# Patient Record
Sex: MALE | Race: WHITE | NOT HISPANIC OR LATINO | URBAN - METROPOLITAN AREA
[De-identification: names, ages, dates, MRNs, and addresses within clinical notes are randomized per-mention and may not be internally consistent; named-entity substitution may affect disease eponyms.]

---

## 2023-06-06 LAB
ALANINE AMINOTRANSFERASE (SGPT) (U/L) IN SER/PLAS: 21 U/L (ref 10–52)
ALBUMIN (G/DL) IN SER/PLAS: 4.3 G/DL (ref 3.4–5)
ALKALINE PHOSPHATASE (U/L) IN SER/PLAS: 57 U/L (ref 33–120)
ANION GAP IN SER/PLAS: 13 MMOL/L (ref 10–20)
APPEARANCE, URINE: CLEAR
ASPARTATE AMINOTRANSFERASE (SGOT) (U/L) IN SER/PLAS: 19 U/L (ref 9–39)
BASOPHILS (10*3/UL) IN BLOOD BY AUTOMATED COUNT: 0.06 X10E9/L (ref 0–0.1)
BASOPHILS/100 LEUKOCYTES IN BLOOD BY AUTOMATED COUNT: 0.8 % (ref 0–2)
BILIRUBIN TOTAL (MG/DL) IN SER/PLAS: 0.8 MG/DL (ref 0–1.2)
BILIRUBIN, URINE: NEGATIVE
BLOOD, URINE: NEGATIVE
CALCIUM (MG/DL) IN SER/PLAS: 9.8 MG/DL (ref 8.6–10.6)
CARBON DIOXIDE, TOTAL (MMOL/L) IN SER/PLAS: 25 MMOL/L (ref 21–32)
CD3+CD4+ ABSOLUTE: 0.28 X10E9/L (ref 0.35–2.74)
CD3+CD8+ ABSOLUTE: 1.18 X10E9/L (ref 0.08–1.49)
CD4/CD8 RATIO: 0.24 (ref 1–3.5)
CD45%: 100 %
CHLORIDE (MMOL/L) IN SER/PLAS: 103 MMOL/L (ref 98–107)
COLOR, URINE: YELLOW
CP CD3+CD4+%: 13 % (ref 29–57)
CP CD3+CD8+%: 55 % (ref 7–31)
CREATININE (MG/DL) IN SER/PLAS: 0.93 MG/DL (ref 0.5–1.3)
EOSINOPHILS (10*3/UL) IN BLOOD BY AUTOMATED COUNT: 0.25 X10E9/L (ref 0–0.7)
EOSINOPHILS/100 LEUKOCYTES IN BLOOD BY AUTOMATED COUNT: 3.4 % (ref 0–6)
ERYTHROCYTE DISTRIBUTION WIDTH (RATIO) BY AUTOMATED COUNT: 13.4 % (ref 11.5–14.5)
ERYTHROCYTE MEAN CORPUSCULAR HEMOGLOBIN CONCENTRATION (G/DL) BY AUTOMATED: 32.3 G/DL (ref 32–36)
ERYTHROCYTE MEAN CORPUSCULAR VOLUME (FL) BY AUTOMATED COUNT: 90 FL (ref 80–100)
ERYTHROCYTES (10*6/UL) IN BLOOD BY AUTOMATED COUNT: 4.84 X10E12/L (ref 4.5–5.9)
FMETH: ABNORMAL
FSIT1: ABNORMAL
GFR MALE: >90 ML/MIN/1.73M2
GLUCOSE (MG/DL) IN SER/PLAS: 84 MG/DL (ref 74–99)
GLUCOSE, URINE: NEGATIVE MG/DL
HEMATOCRIT (%) IN BLOOD BY AUTOMATED COUNT: 43.7 % (ref 41–52)
HEMOGLOBIN (G/DL) IN BLOOD: 14.1 G/DL (ref 13.5–17.5)
IMMATURE GRANULOCYTES/100 LEUKOCYTES IN BLOOD BY AUTOMATED COUNT: 0.4 % (ref 0–0.9)
KETONES, URINE: NEGATIVE MG/DL
LEUKOCYTE ESTERASE, URINE: NEGATIVE
LEUKOCYTES (10*3/UL) IN BLOOD BY AUTOMATED COUNT: 7.4 X10E9/L (ref 4.4–11.3)
LYMPHOCYTES (10*3/UL) IN BLOOD BY AUTOMATED COUNT: 2.15 X10E9/L (ref 1.2–4.8)
LYMPHOCYTES/100 LEUKOCYTES IN BLOOD BY AUTOMATED COUNT: 29.2 % (ref 13–44)
MONOCYTES (10*3/UL) IN BLOOD BY AUTOMATED COUNT: 0.57 X10E9/L (ref 0.1–1)
MONOCYTES/100 LEUKOCYTES IN BLOOD BY AUTOMATED COUNT: 7.7 % (ref 2–10)
NEUTROPHILS (10*3/UL) IN BLOOD BY AUTOMATED COUNT: 4.31 X10E9/L (ref 1.2–7.7)
NEUTROPHILS/100 LEUKOCYTES IN BLOOD BY AUTOMATED COUNT: 58.5 % (ref 40–80)
NITRITE, URINE: NEGATIVE
NRBC (PER 100 WBCS) BY AUTOMATED COUNT: 0 /100 WBC (ref 0–0)
PH, URINE: 7 (ref 5–8)
PLATELETS (10*3/UL) IN BLOOD AUTOMATED COUNT: 251 X10E9/L (ref 150–450)
POTASSIUM (MMOL/L) IN SER/PLAS: 4.3 MMOL/L (ref 3.5–5.3)
PROTEIN TOTAL: 7 G/DL (ref 6.4–8.2)
PROTEIN, URINE: NEGATIVE MG/DL
SODIUM (MMOL/L) IN SER/PLAS: 137 MMOL/L (ref 136–145)
SPECIFIC GRAVITY, URINE: 1.02 (ref 1–1.03)
UREA NITROGEN (MG/DL) IN SER/PLAS: 19 MG/DL (ref 6–23)
UROBILINOGEN, URINE: 4 MG/DL (ref 0–1.9)

## 2023-06-07 LAB
HIV-1 RNA PCR VIRAL LOAD LOG: NORMAL LOG10 CPY/ML
HIV-1 RNA VIRAL LOAD: NOT DETECTED COPIES/ML

## 2023-07-20 ENCOUNTER — HOSPITAL ENCOUNTER (OUTPATIENT)
Dept: DATA CONVERSION | Facility: HOSPITAL | Age: 58
End: 2023-07-20
Attending: INTERNAL MEDICINE | Admitting: INTERNAL MEDICINE
Payer: COMMERCIAL

## 2023-07-20 DIAGNOSIS — K31.5 OBSTRUCTION OF DUODENUM (HHS-HCC): ICD-10-CM

## 2023-07-20 DIAGNOSIS — Z13.810 ENCOUNTER FOR SCREENING FOR UPPER GASTROINTESTINAL DISORDER: ICD-10-CM

## 2023-07-20 DIAGNOSIS — K44.9 DIAPHRAGMATIC HERNIA WITHOUT OBSTRUCTION OR GANGRENE: ICD-10-CM

## 2023-08-23 PROBLEM — M79.661 RIGHT CALF PAIN: Status: ACTIVE | Noted: 2023-08-23

## 2023-08-23 PROBLEM — R20.2 PARESTHESIA OF ARM: Status: ACTIVE | Noted: 2023-08-23

## 2023-08-23 PROBLEM — M50.30 DISC DISEASE, DEGENERATIVE, CERVICAL: Status: ACTIVE | Noted: 2023-08-23

## 2023-08-23 PROBLEM — K21.9 GERD (GASTROESOPHAGEAL REFLUX DISEASE): Status: ACTIVE | Noted: 2023-08-23

## 2023-08-23 PROBLEM — I10 HYPERTENSION: Status: ACTIVE | Noted: 2023-08-23

## 2023-08-23 PROBLEM — R22.1 MASS OF SKIN OF NECK: Status: ACTIVE | Noted: 2023-08-23

## 2023-08-23 PROBLEM — N43.3 HYDROCELE: Status: ACTIVE | Noted: 2023-08-23

## 2023-08-23 PROBLEM — M79.10 MUSCULAR ACHES: Status: ACTIVE | Noted: 2023-08-23

## 2023-08-23 PROBLEM — M54.2 NECK PAIN: Status: ACTIVE | Noted: 2023-08-23

## 2023-08-23 PROBLEM — K62.82 ANAL DYSPLASIA: Status: ACTIVE | Noted: 2023-08-23

## 2023-08-23 PROBLEM — K57.32 DIVERTICULITIS OF COLON: Status: ACTIVE | Noted: 2023-08-23

## 2023-08-23 PROBLEM — L98.9 SKIN LESION: Status: ACTIVE | Noted: 2023-08-23

## 2023-08-23 PROBLEM — M54.50 LOW BACK PAIN: Status: ACTIVE | Noted: 2023-08-23

## 2023-08-23 PROBLEM — N50.89 SWELLING OF TESTICLE PRESENT ON EXAMINATION: Status: ACTIVE | Noted: 2023-08-23

## 2023-08-23 PROBLEM — B20 AIDS (MULTI): Status: ACTIVE | Noted: 2023-08-23

## 2023-08-23 PROBLEM — A63.0 ANAL CONDYLOMA: Status: ACTIVE | Noted: 2023-08-23

## 2023-08-23 PROBLEM — L21.9 SEBORRHEIC DERMATITIS: Status: ACTIVE | Noted: 2023-08-23

## 2023-08-23 PROBLEM — R63.5 WEIGHT GAIN: Status: ACTIVE | Noted: 2023-08-23

## 2023-08-23 PROBLEM — F41.9 ANXIETY: Status: ACTIVE | Noted: 2023-08-23

## 2023-08-23 PROBLEM — N50.82 SCROTAL PAIN: Status: ACTIVE | Noted: 2023-08-23

## 2023-08-23 PROBLEM — E78.00 HYPERCHOLESTEROLEMIA: Status: ACTIVE | Noted: 2023-08-23

## 2023-08-23 PROBLEM — J02.9 PHARYNGITIS: Status: ACTIVE | Noted: 2023-08-23

## 2023-08-23 PROBLEM — D17.0 LIPOMA OF SKIN AND SUBCUTANEOUS TISSUE OF NECK: Status: ACTIVE | Noted: 2023-08-23

## 2023-08-23 PROBLEM — D17.1 ABDOMINAL LIPOMA: Status: ACTIVE | Noted: 2023-08-23

## 2023-08-23 PROBLEM — I77.810 AORTIC ROOT DILATATION (CMS-HCC): Status: ACTIVE | Noted: 2023-08-23

## 2023-08-23 RX ORDER — LANSOPRAZOLE 30 MG/1
30 CAPSULE, DELAYED RELEASE ORAL 2 TIMES DAILY
COMMUNITY
Start: 2017-04-18 | End: 2024-02-13 | Stop reason: SDUPTHER

## 2023-08-23 RX ORDER — ALBUTEROL SULFATE 90 UG/1
2 AEROSOL, METERED RESPIRATORY (INHALATION) EVERY 4 HOURS PRN
COMMUNITY
Start: 2021-10-28 | End: 2023-10-03 | Stop reason: ALTCHOICE

## 2023-08-23 RX ORDER — MELOXICAM 15 MG/1
15 TABLET ORAL DAILY
COMMUNITY
Start: 2022-09-15 | End: 2023-10-03 | Stop reason: ALTCHOICE

## 2023-08-23 RX ORDER — KETOCONAZOLE 20 MG/ML
SHAMPOO, SUSPENSION TOPICAL 3 TIMES WEEKLY
COMMUNITY
Start: 2021-12-21

## 2023-08-23 RX ORDER — SERTRALINE HYDROCHLORIDE 25 MG/1
25 TABLET, FILM COATED ORAL DAILY
COMMUNITY
Start: 2021-12-21 | End: 2023-10-18 | Stop reason: SDUPTHER

## 2023-08-23 RX ORDER — TALC
5 POWDER (GRAM) TOPICAL NIGHTLY
COMMUNITY

## 2023-08-23 RX ORDER — DARUNAVIR 800 MG/1
800 TABLET, FILM COATED ORAL DAILY
COMMUNITY
End: 2024-01-18 | Stop reason: SDUPTHER

## 2023-08-23 RX ORDER — LISINOPRIL 10 MG/1
10 TABLET ORAL DAILY
COMMUNITY
Start: 2023-07-14 | End: 2024-02-13 | Stop reason: SDUPTHER

## 2023-08-23 RX ORDER — ELVITEGRAVIR, COBICISTAT, EMTRICITABINE, AND TENOFOVIR ALAFENAMIDE 150; 150; 200; 10 MG/1; MG/1; MG/1; MG/1
1 TABLET ORAL DAILY
COMMUNITY
Start: 2023-07-14 | End: 2024-01-18 | Stop reason: SDUPTHER

## 2023-08-23 RX ORDER — ASPIRIN 325 MG
325 TABLET ORAL DAILY
COMMUNITY
Start: 2018-09-22

## 2023-08-23 RX ORDER — TRIAMCINOLONE ACETONIDE 1 MG/G
CREAM TOPICAL 2 TIMES DAILY
COMMUNITY
Start: 2021-12-21

## 2023-08-23 RX ORDER — HYDROCHLOROTHIAZIDE 12.5 MG/1
12.5 TABLET ORAL DAILY
COMMUNITY
Start: 2020-06-18 | End: 2024-01-18 | Stop reason: SDUPTHER

## 2023-08-23 RX ORDER — PRAVASTATIN SODIUM 10 MG/1
10 TABLET ORAL DAILY
COMMUNITY
End: 2023-10-18 | Stop reason: ENTERED-IN-ERROR

## 2023-08-23 RX ORDER — NAPROXEN 500 MG/1
500 TABLET ORAL
COMMUNITY
End: 2024-01-18 | Stop reason: SDUPTHER

## 2023-08-23 RX ORDER — ATORVASTATIN CALCIUM 20 MG/1
20 TABLET, FILM COATED ORAL DAILY
COMMUNITY
Start: 2021-06-15 | End: 2023-10-18 | Stop reason: SDUPTHER

## 2023-08-23 RX ORDER — SERTRALINE HYDROCHLORIDE 50 MG/1
50 TABLET, FILM COATED ORAL DAILY
COMMUNITY
Start: 2022-01-19 | End: 2023-10-18 | Stop reason: SDUPTHER

## 2023-09-29 VITALS — HEIGHT: 73 IN | BODY MASS INDEX: 39.59 KG/M2 | WEIGHT: 298.72 LBS

## 2023-09-30 NOTE — H&P
History of Present Illness:   Admission Reason: gastric outlet obstruction   HPI:    ZAIRA GARCIA is a 58 year old Male           Allergies:  ·  No Known Allergies :     Medications Prior to Admission:   Admission Medication Reconciliation has not been completed for this patient.    Objective:     Objective Information:        Pain reported at 7/20 11:28: 0 = None    Physical Exam by System:    Constitutional: Well developed, awake/alert/oriented  x3, no distress, alert and cooperative   Eyes: PERRL, EOMI, clear sclera   ENMT: mucous membranes moist, no apparent injury,  no lesions seen   Head/Neck: Neck supple, no apparent injury, thyroid  without mass or tenderness, No JVD, trachea midline, no bruits   Respiratory/Thorax: Patent airways, CTAB, normal  breath sounds with good chest expansion, thorax symmetric   Cardiovascular: Regular, rate and rhythm, no murmurs,  2+ equal pulses of the extremities, normal S 1and S 2   Gastrointestinal: Nondistended, soft, non-tender,  no rebound tenderness or guarding, no masses palpable, no organomegaly, +BS, no bruits   Genitourinary: No Discharge, vesicles or other abnormalities   Musculoskeletal: ROM intact, no joint swelling, normal  strength   Extremities: normal extremities, no cyanosis edema,  contusions or wounds, no clubbing   Neurological: alert and oriented x3, intact senses,  motor, response and reflexes, normal strength   Skin: Warm and dry, no lesions, no rashes       Electronic Signatures:  Aquiles Borja)  (Signed 20-Jul-2023 12:12)   Authored: History of Present Illness, Comorbidities,  Allergies, Medications Prior to Admission, Objective, Note Completion      Last Updated: 20-Jul-2023 12:12 by Aquiles Borja)

## 2023-10-02 NOTE — PROGRESS NOTES
"HIV Clinic Follow-up Visit:    Severino Man was last seen in [facility] 6/23  Missed antiretroviral doses in last 72 hours? {antiretroviral doses:77917::\"No\"}    Doing better, doing PT and feels much better. 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 Doing wet needling and exercises. Started MG supplement in last month and helps too  Taking his meds, no problems .  Mood ok-some headaches from sinus with weather changes.  Thinks sertraline doing well for his anxiety.    Review of Systems  Negative as per HPI    CURRENT MEDICATIONS:    Current Outpatient Medications:     aspirin 325 mg tablet, Take 1 tablet (325 mg) by mouth once daily., Disp: , Rfl:     atorvastatin (Lipitor) 20 mg tablet, Take 1 tablet (20 mg) by mouth once daily., Disp: , Rfl:     darunavir (Prezista) 800 mg tablet, Take 1 tablet (800 mg) by mouth once daily., Disp: , Rfl:     Genvoya [phone] mg tablet, Take 1 tablet by mouth once daily., Disp: , Rfl:     hydroCHLOROthiazide (HYDRODiuril) 12.5 mg tablet, Take 1 tablet (12.5 mg) by mouth once daily., Disp: , Rfl:     ketoconazole (NIZOral) 2 % shampoo, Apply topically 3 (three) times a week. Apply to scalp every other day for 5 minutes and rinse, Disp: , Rfl:     lansoprazole (Prevacid) 30 mg DR capsule, Take 1 capsule (30 mg) by mouth 2 times a day., Disp: , Rfl:     lisinopril 10 mg tablet, Take 1 tablet (10 mg) by mouth once daily., Disp: , Rfl:     magnesium gluconate (Magonate) 27.5 mg magne- sium (500 mg) tablet, Take 1 tablet (27.5 mg) by mouth 2 times a day., Disp: , Rfl:     melatonin 3 mg tablet, Take 5 mg by mouth once daily at bedtime., Disp: , Rfl:     naproxen (Naprosyn) 500 mg tablet, Take 1 tablet (500 mg) by mouth 2 times a day with meals., Disp: , Rfl:     pravastatin (Pravachol) 10 mg tablet, Take 1 tablet (10 mg) by mouth once daily., Disp: , Rfl:     sertraline (Zoloft) 25 mg tablet, Take 1 tablet (25 mg) by mouth once daily., Disp: , Rfl:     sertraline " "(Zoloft) 50 mg tablet, Take 1 tablet (50 mg) by mouth once daily., Disp: , Rfl:     triamcinolone (Kenalog) 0.1 % cream, Apply topically twice a day., Disp: , Rfl:     PHYSICAL EXAMINATION:  Visit Vitals  /76 (BP Location: Left arm, Patient Position: Sitting, BP Cuff Size: Large adult)   Pulse 62   Temp 36.8 °C (98.3 °F) (Temporal)   Resp 18   Ht 1.854 m (6' 1\")   Wt 137 kg (301 lb 6.4 oz)   SpO2 96%   BMI 39.76 kg/m²   Smoking Status Former   BSA 2.66 m²       Physical Exam   Physical Exam  Constitutional:       Appearance: He is obese.   HENT:      Head: Normocephalic and atraumatic.      Mouth/Throat:      Pharynx: Oropharynx is clear.   Cardiovascular:      Rate and Rhythm: Normal rate.   Pulmonary:      Breath sounds: Normal breath sounds.   Abdominal:      Palpations: Abdomen is soft.   Musculoskeletal:      Cervical back: Neck supple.   Neurological:      Mental Status: He is alert.   Psychiatric:         Mood and Affect: Mood normal.         PERTINENT DATA:    The 10-year ASCVD risk score (Chatsworth DK, et al., 2019) is: 6.9%    Values used to calculate the score:      Age: 58 years      Sex: Male      Is Non- : No      Diabetic: No      Tobacco smoker: No      Systolic Blood Pressure: 119 mmHg      Is BP treated: Yes      HDL Cholesterol: 37.4 mg/dL      Total Cholesterol: 133 mg/dL    ASSESSMENT / PLAN:  ***    Becca Lacy MD                    "

## 2023-10-02 NOTE — PROGRESS NOTES
Physical Therapy Treatment    Patient Name: Severino Man  MRN: [id number]  Today's Date: 10/3/2023         Assessment:  Reports relief post TPN.  Demonstrates improved TA activation.  Able to progress core stabilization.    Plan:  Continue to progress core strengthening as tolerated.  TPN PRN.    Current Problem  1. Flank pain            Subjective   Reports relief from TPN last visit.  First few days were great and then sx started to come back but not to same intensity as before.    Pain  Pain Assessment: 0-10  Pain Score: 4  Pain Location:  (R flank and back)    Objective   TTP R L3-5 UPA    Outcome Measures:  Other Measures  Oswestry Disablity Index (WILBERTO): 28%    Treatments:  THERAPEUTIC EXERCISE:  TA review  TA marches 10x ea  Bridges 10x  SL ER 10x ea  SLS with 3-way tap 10x ea    MANUAL THERAPY:  TPN: R L3-5 multifidi with 60mm ea  R L3-5 UPA     HEP   Access Code: [email or web address]  URL: [email or web address]  Date: 10/03/2023  Prepared by: Che Hathaway    Exercises  - Supine Transversus Abdominis Bracing - Hands on Stomach  - 2 x daily - 7 x weekly - 1 sets - 10 reps - 5 hold  - Supine Lower Trunk Rotation  - 2 x daily - 7 x weekly - 1 sets - 10 reps  - Thoracic Sidebending with Towel Roll  - 2 x daily - 7 x weekly - 1 sets - 10 reps - 5 hold  - Hooklying Rib Cage Breathing  - 2 x daily - 7 x weekly - 1 sets - 10 reps - 5 hold  - Supine Figure 4 Piriformis Stretch  - 2 x daily - 7 x weekly - 1 sets - 5 reps - 10 hold  - Supine Posterior Pelvic Tilt  - 2 x daily - 7 x weekly - 1 sets - 10 reps - 5 hold  - Supine March  - 2 x daily - 7 x weekly - 1 sets - 10 reps  - Supine Bridge  - 2 x daily - 7 x weekly - 1 sets - 10 reps  - Clamshell  - 2 x daily - 7 x weekly - 1 sets - 10 reps  - Single Leg Balance with Clock Reach  - 2 x daily - 7 x weekly - 1 sets - 10 reps    Goals:  1. Pain 0/10  2. Oswestry: 10%  3. Independent activation of TA with all functional mobility to allow the patient to  perform bed mobility without increased sx.  4. No reports of TTP along QL or intercostals to allow patient to lift/carry > 10# without increased sx.  5. Demonstrates independence with HEP.

## 2023-10-03 ENCOUNTER — TREATMENT (OUTPATIENT)
Dept: PHYSICAL THERAPY | Facility: CLINIC | Age: 58
End: 2023-10-03
Payer: COMMERCIAL

## 2023-10-03 ENCOUNTER — OFFICE VISIT (OUTPATIENT)
Dept: IMMUNOLOGY | Facility: CLINIC | Age: 58
End: 2023-10-03
Payer: COMMERCIAL

## 2023-10-03 VITALS
WEIGHT: 301.4 LBS | HEART RATE: 62 BPM | DIASTOLIC BLOOD PRESSURE: 76 MMHG | HEIGHT: 73 IN | TEMPERATURE: 98.3 F | SYSTOLIC BLOOD PRESSURE: 119 MMHG | BODY MASS INDEX: 39.94 KG/M2 | OXYGEN SATURATION: 96 % | RESPIRATION RATE: 18 BRPM

## 2023-10-03 DIAGNOSIS — E78.00 HYPERCHOLESTEROLEMIA: ICD-10-CM

## 2023-10-03 DIAGNOSIS — R10.9 FLANK PAIN: Primary | ICD-10-CM

## 2023-10-03 DIAGNOSIS — I10 HYPERTENSION, UNSPECIFIED TYPE: ICD-10-CM

## 2023-10-03 DIAGNOSIS — Z23 NEED FOR VACCINATION: ICD-10-CM

## 2023-10-03 DIAGNOSIS — B20 AIDS (MULTI): Primary | ICD-10-CM

## 2023-10-03 DIAGNOSIS — F41.9 ANXIETY: ICD-10-CM

## 2023-10-03 PROBLEM — N50.89 SWELLING OF TESTICLE PRESENT ON EXAMINATION: Status: RESOLVED | Noted: 2023-08-23 | Resolved: 2023-10-03

## 2023-10-03 PROBLEM — Z87.09 PERSONAL HISTORY OF OTHER DISEASES OF THE RESPIRATORY SYSTEM: Status: ACTIVE | Noted: 2023-10-03

## 2023-10-03 PROBLEM — Z87.09 PERSONAL HISTORY OF OTHER DISEASES OF THE RESPIRATORY SYSTEM: Status: RESOLVED | Noted: 2023-10-03 | Resolved: 2023-10-03

## 2023-10-03 PROBLEM — J02.9 PHARYNGITIS: Status: RESOLVED | Noted: 2023-08-23 | Resolved: 2023-10-03

## 2023-10-03 PROBLEM — K57.92 DIVERTICULITIS OF INTESTINE, PART UNSPECIFIED, WITHOUT PERFORATION OR ABSCESS WITHOUT BLEEDING: Status: ACTIVE | Noted: 2023-10-03

## 2023-10-03 PROBLEM — M79.661 RIGHT CALF PAIN: Status: RESOLVED | Noted: 2023-08-23 | Resolved: 2023-10-03

## 2023-10-03 PROBLEM — Z87.01 PERSONAL HISTORY OF PNEUMONIA (RECURRENT): Status: ACTIVE | Noted: 2023-10-03

## 2023-10-03 PROBLEM — R22.1 MASS OF SKIN OF NECK: Status: RESOLVED | Noted: 2023-08-23 | Resolved: 2023-10-03

## 2023-10-03 LAB
ALBUMIN SERPL BCP-MCNC: 4.4 G/DL (ref 3.4–5)
ALP SERPL-CCNC: 59 U/L (ref 33–120)
ALT SERPL W P-5'-P-CCNC: 27 U/L (ref 10–52)
ANION GAP SERPL CALC-SCNC: 15 MMOL/L (ref 10–20)
AST SERPL W P-5'-P-CCNC: 17 U/L (ref 9–39)
BASOPHILS # BLD AUTO: 0.08 X10*3/UL (ref 0–0.1)
BASOPHILS NFR BLD AUTO: 1 %
BILIRUB SERPL-MCNC: 0.6 MG/DL (ref 0–1.2)
BUN SERPL-MCNC: 19 MG/DL (ref 6–23)
CALCIUM SERPL-MCNC: 9.8 MG/DL (ref 8.6–10.6)
CHLORIDE SERPL-SCNC: 105 MMOL/L (ref 98–107)
CHOLEST SERPL-MCNC: 147 MG/DL (ref 0–199)
CHOLESTEROL/HDL RATIO: 3.5
CO2 SERPL-SCNC: 24 MMOL/L (ref 21–32)
CREAT SERPL-MCNC: 0.83 MG/DL (ref 0.5–1.3)
EOSINOPHIL # BLD AUTO: 0.32 X10*3/UL (ref 0–0.7)
EOSINOPHIL NFR BLD AUTO: 3.9 %
ERYTHROCYTE [DISTWIDTH] IN BLOOD BY AUTOMATED COUNT: 13.3 % (ref 11.5–14.5)
GFR SERPL CREATININE-BSD FRML MDRD: >90 ML/MIN/1.73M*2
GLUCOSE SERPL-MCNC: 70 MG/DL (ref 74–99)
HCT VFR BLD AUTO: 45.6 % (ref 41–52)
HDLC SERPL-MCNC: 42.2 MG/DL
HGB BLD-MCNC: 14.5 G/DL (ref 13.5–17.5)
IMM GRANULOCYTES # BLD AUTO: 0.05 X10*3/UL (ref 0–0.7)
IMM GRANULOCYTES NFR BLD AUTO: 0.6 % (ref 0–0.9)
LDLC SERPL CALC-MCNC: 84 MG/DL (ref 140–190)
LYMPHOCYTES # BLD AUTO: 2.47 X10*3/UL (ref 1.2–4.8)
LYMPHOCYTES NFR BLD AUTO: 29.9 %
MCH RBC QN AUTO: 29.6 PG (ref 26–34)
MCHC RBC AUTO-ENTMCNC: 31.8 G/DL (ref 32–36)
MCV RBC AUTO: 93 FL (ref 80–100)
MONOCYTES # BLD AUTO: 0.63 X10*3/UL (ref 0.1–1)
MONOCYTES NFR BLD AUTO: 7.6 %
NEUTROPHILS # BLD AUTO: 4.72 X10*3/UL (ref 1.2–7.7)
NEUTROPHILS NFR BLD AUTO: 57 %
NON HDL CHOLESTEROL: 105 MG/DL (ref 0–149)
NRBC BLD-RTO: 0 /100 WBCS (ref 0–0)
PLATELET # BLD AUTO: 266 X10*3/UL (ref 150–450)
PMV BLD AUTO: 11.3 FL (ref 7.5–11.5)
POTASSIUM SERPL-SCNC: 4.3 MMOL/L (ref 3.5–5.3)
PROT SERPL-MCNC: 6.8 G/DL (ref 6.4–8.2)
RBC # BLD AUTO: 4.9 X10*6/UL (ref 4.5–5.9)
SODIUM SERPL-SCNC: 140 MMOL/L (ref 136–145)
TRIGL SERPL-MCNC: 105 MG/DL (ref 0–149)
VLDL: 21 MG/DL (ref 0–40)
WBC # BLD AUTO: 8.3 X10*3/UL (ref 4.4–11.3)

## 2023-10-03 PROCEDURE — 4274F FLU IMMUNO ADMIND RCVD: CPT | Performed by: INTERNAL MEDICINE

## 2023-10-03 PROCEDURE — 99214 OFFICE O/P EST MOD 30 MIN: CPT | Performed by: INTERNAL MEDICINE

## 2023-10-03 PROCEDURE — 99214 OFFICE O/P EST MOD 30 MIN: CPT | Mod: 25 | Performed by: INTERNAL MEDICINE

## 2023-10-03 PROCEDURE — 80051 ELECTROLYTE PANEL: CPT | Performed by: INTERNAL MEDICINE

## 2023-10-03 PROCEDURE — 97140 MANUAL THERAPY 1/> REGIONS: CPT | Mod: GP | Performed by: PHYSICAL THERAPIST

## 2023-10-03 PROCEDURE — 85025 COMPLETE CBC W/AUTO DIFF WBC: CPT | Performed by: INTERNAL MEDICINE

## 2023-10-03 PROCEDURE — 80061 LIPID PANEL: CPT | Performed by: INTERNAL MEDICINE

## 2023-10-03 PROCEDURE — 84075 ASSAY ALKALINE PHOSPHATASE: CPT | Performed by: INTERNAL MEDICINE

## 2023-10-03 PROCEDURE — 3074F SYST BP LT 130 MM HG: CPT | Performed by: INTERNAL MEDICINE

## 2023-10-03 PROCEDURE — 90471 IMMUNIZATION ADMIN: CPT | Performed by: INTERNAL MEDICINE

## 2023-10-03 PROCEDURE — 88185 FLOWCYTOMETRY/TC ADD-ON: CPT | Mod: TC | Performed by: INTERNAL MEDICINE

## 2023-10-03 PROCEDURE — 3078F DIAST BP <80 MM HG: CPT | Performed by: INTERNAL MEDICINE

## 2023-10-03 PROCEDURE — 36415 COLL VENOUS BLD VENIPUNCTURE: CPT | Performed by: INTERNAL MEDICINE

## 2023-10-03 PROCEDURE — 97110 THERAPEUTIC EXERCISES: CPT | Mod: GP | Performed by: PHYSICAL THERAPIST

## 2023-10-03 PROCEDURE — 1036F TOBACCO NON-USER: CPT | Performed by: INTERNAL MEDICINE

## 2023-10-03 RX ORDER — MAGNESIUM GLUCONATE 27.5 (500)
27.5 TABLET ORAL 2 TIMES DAILY
COMMUNITY

## 2023-10-03 ASSESSMENT — PAIN SCALES - GENERAL
PAINLEVEL_OUTOF10: 4
PAINLEVEL: 0-NO PAIN

## 2023-10-03 ASSESSMENT — PAIN - FUNCTIONAL ASSESSMENT: PAIN_FUNCTIONAL_ASSESSMENT: 0-10

## 2023-10-03 NOTE — PROGRESS NOTES
"HIV Clinic Follow-up Visit:    Severino Man was last seen in [facility] 6/23      Doing better, doing PT and feels much better. 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 Doing wet needling and exercises. Started MG supplement in last month and helps too  Taking his meds, no problems .  Mood ok-some headaches from sinus with weather changes.  Thinks sertraline doing well for his anxiety.    Review of Systems  Negative as per HPI    PHYSICAL EXAMINATION:  Visit Vitals  /76 (BP Location: Left arm, Patient Position: Sitting, BP Cuff Size: Large adult)   Pulse 62   Temp 36.8 °C (98.3 °F) (Temporal)   Resp 18   Ht 1.854 m (6' 1\")   Wt 137 kg (301 lb 6.4 oz)   SpO2 96%   BMI 39.76 kg/m²   Smoking Status Former   BSA 2.66 m²         Physical Exam  Constitutional:       Appearance: He is obese.   HENT:      Head: Normocephalic and atraumatic.      Mouth/Throat:      Pharynx: Oropharynx is clear.   Cardiovascular:      Rate and Rhythm: Normal rate.   Pulmonary:      Breath sounds: Normal breath sounds.   Abdominal:      Palpations: Abdomen is soft.   Musculoskeletal:      Cervical back: Neck supple.   Neurological:      Mental Status: He is alert.   Psychiatric:         Mood and Affect: Mood normal.         CURRENT MEDICATIONS:    Current Outpatient Medications:     aspirin 325 mg tablet, Take 1 tablet (325 mg) by mouth once daily., Disp: , Rfl:     atorvastatin (Lipitor) 20 mg tablet, Take 1 tablet (20 mg) by mouth once daily., Disp: , Rfl:     darunavir (Prezista) 800 mg tablet, Take 1 tablet (800 mg) by mouth once daily., Disp: , Rfl:     Genvoya [phone] mg tablet, Take 1 tablet by mouth once daily., Disp: , Rfl:     hydroCHLOROthiazide (HYDRODiuril) 12.5 mg tablet, Take 1 tablet (12.5 mg) by mouth once daily., Disp: , Rfl:     ketoconazole (NIZOral) 2 % shampoo, Apply topically 3 (three) times a week. Apply to scalp every other day for 5 minutes and rinse, Disp: , Rfl:     lansoprazole " (Prevacid) 30 mg DR capsule, Take 1 capsule (30 mg) by mouth 2 times a day., Disp: , Rfl:     lisinopril 10 mg tablet, Take 1 tablet (10 mg) by mouth once daily., Disp: , Rfl:     magnesium gluconate (Magonate) 27.5 mg magne- sium (500 mg) tablet, Take 1 tablet (27.5 mg) by mouth 2 times a day., Disp: , Rfl:     melatonin 3 mg tablet, Take 5 mg by mouth once daily at bedtime., Disp: , Rfl:     naproxen (Naprosyn) 500 mg tablet, Take 1 tablet (500 mg) by mouth 2 times a day with meals., Disp: , Rfl:     pravastatin (Pravachol) 10 mg tablet, Take 1 tablet (10 mg) by mouth once daily., Disp: , Rfl:     sertraline (Zoloft) 25 mg tablet, Take 1 tablet (25 mg) by mouth once daily., Disp: , Rfl:     sertraline (Zoloft) 50 mg tablet, Take 1 tablet (50 mg) by mouth once daily., Disp: , Rfl:     triamcinolone (Kenalog) 0.1 % cream, Apply topically twice a day., Disp: , Rfl:       ASSESSMENT / PLAN:  Problem List Items Addressed This Visit       AIDS (CMS/HCC) - Primary    Current Assessment & Plan     UD with CD4 280, continue genvoya/prezista  Check labs today         Relevant Orders    CD4/8 Panel    CBC and Auto Differential    Comprehensive metabolic panel    Anxiety    Current Assessment & Plan     Doing well, thinks sertraline working         Hypertension    Current Assessment & Plan     Good today, continue lisinopril, hctz         Hypercholesterolemia    Current Assessment & Plan     Check lipids today, continue atorvastatin         Relevant Orders    Lipid panel     Other Visit Diagnoses       Need for vaccination        Relevant Orders    Flu vaccine, MDCK, quadrivalent, with preservative (Completed)         Heal;ht maint:went to dentist last week. UTD with vaccines. Not sexually active ,no sti screen    Becca Lacy MD

## 2023-10-03 NOTE — PROGRESS NOTES
"HIV Clinic Follow-up Visit:    Severino Man was last seen in [facility] on Visit date not found  Missed antiretroviral doses in last 72 hours? {antiretroviral doses:72202::\"No\"}  Sexually active? {yes/no:97355}, Partner/s aware of diagnosis? {yes/no:47648},   Condom use? {Condom use:31588}, Partner on PrEP? {YES OR NO - DEFAULT NO:92587}      Review of Systems  Review of Systems    CURRENT MEDICATIONS:    Current Outpatient Medications:     aspirin 325 mg tablet, Take 1 tablet (325 mg) by mouth once daily., Disp: , Rfl:     atorvastatin (Lipitor) 20 mg tablet, Take 1 tablet (20 mg) by mouth once daily., Disp: , Rfl:     darunavir (Prezista) 800 mg tablet, Take 1 tablet (800 mg) by mouth once daily., Disp: , Rfl:     Genvoya [phone] mg tablet, Take 1 tablet by mouth once daily., Disp: , Rfl:     hydroCHLOROthiazide (HYDRODiuril) 12.5 mg tablet, Take 1 tablet (12.5 mg) by mouth once daily., Disp: , Rfl:     ketoconazole (NIZOral) 2 % shampoo, Apply topically 3 (three) times a week. Apply to scalp every other day for 5 minutes and rinse, Disp: , Rfl:     lansoprazole (Prevacid) 30 mg DR capsule, Take 1 capsule (30 mg) by mouth 2 times a day., Disp: , Rfl:     lisinopril 10 mg tablet, Take 1 tablet (10 mg) by mouth once daily., Disp: , Rfl:     magnesium gluconate (Magonate) 27.5 mg magne- sium (500 mg) tablet, Take 1 tablet (27.5 mg) by mouth 2 times a day., Disp: , Rfl:     melatonin 3 mg tablet, Take 5 mg by mouth once daily at bedtime., Disp: , Rfl:     naproxen (Naprosyn) 500 mg tablet, Take 1 tablet (500 mg) by mouth 2 times a day with meals., Disp: , Rfl:     pravastatin (Pravachol) 10 mg tablet, Take 1 tablet (10 mg) by mouth once daily., Disp: , Rfl:     sertraline (Zoloft) 25 mg tablet, Take 1 tablet (25 mg) by mouth once daily., Disp: , Rfl:     sertraline (Zoloft) 50 mg tablet, Take 1 tablet (50 mg) by mouth once daily., Disp: , Rfl:     triamcinolone (Kenalog) 0.1 % cream, Apply topically twice a day., " "Disp: , Rfl:     PHYSICAL EXAMINATION:  Visit Vitals  /76 (BP Location: Left arm, Patient Position: Sitting, BP Cuff Size: Large adult)   Pulse 62   Temp 36.8 °C (98.3 °F) (Temporal)   Resp 18   Ht 1.854 m (6' 1\")   Wt 137 kg (301 lb 6.4 oz)   SpO2 96%   BMI 39.76 kg/m²   Smoking Status Former   BSA 2.66 m²       Physical Exam   Physical Exam    PERTINENT DATA:  CrCl cannot be calculated (Patient's most recent lab result is older than the maximum 7 days allowed.).  The 10-year ASCVD risk score (Maurice DK, et al., 2019) is: 6.9%    Values used to calculate the score:      Age: 58 years      Sex: Male      Is Non- : No      Diabetic: No      Tobacco smoker: No      Systolic Blood Pressure: 119 mmHg      Is BP treated: Yes      HDL Cholesterol: 37.4 mg/dL      Total Cholesterol: 133 mg/dL    ASSESSMENT / PLAN:  ***    Becca Lacy MD      "

## 2023-10-04 LAB
CD3+CD4+ CELLS # BLD: 0.37 X10E9/L
CD3+CD4+ CELLS NFR BLD: 15 %
CD3+CD4+ CELLS/CD3+CD8+ CLL BLD: 0.28 %
CD3+CD8+ CELLS # BLD: 1.33 X10E9/L
CD3+CD8+ CELLS NFR BLD: 54 %
LYMPHOCYTES # SPEC AUTO: 2.47 X10*3/UL

## 2023-10-09 NOTE — PROGRESS NOTES
"Physical Therapy Treatment    Patient Name: Severino Man  MRN: [id number]  Today's Date: 10/10/2023  Time Calculation  Start Time: 0707  Stop Time: 0750  Time Calculation (min): 43 min    Insurance:  Visit:  4 of 20  Auth required: No    Assessment:  Able to progress hip/core strengthening without increased sx.  Able to elicit twitch response with TPN.    Plan:  Progress strengthening of hip.  Initiate TG.  Patient to initiate walking for exercise at home.    Current Problem  1. Flank pain            Subjective   Continues to have relief with TPN.  Reports higher threshold for pain.  Not having much pain with walking but apprehensive about resuming walking distance for exercise.      Pain  Pain Assessment: 0-10  Pain Score: 3  Pain Location:  (R flank)    Objective   TTP R QL and glute med    Outcome Measures:  Other Measures  Oswestry Disablity Index (WILBERTO): 28%    Treatments:  THERAPEUTIC EXERCISE:  LTR +fig 4 20\"x2 ea  SL ER 10x ea  QL hip hike off 2\" block 10x ea  GTB side stepping 2 laps    MANUAL THERAPY:  MFR with tiger tail to R glute med  TPN: R QL with 100mm    HEP   Access Code: [email or web address]  URL: [email or web address]  Date: 10/03/2023  Prepared by: Che Hathaway    Exercises  - Supine Transversus Abdominis Bracing - Hands on Stomach  - 2 x daily - 7 x weekly - 1 sets - 10 reps - 5 hold  - Supine Lower Trunk Rotation  - 2 x daily - 7 x weekly - 1 sets - 10 reps  - Thoracic Sidebending with Towel Roll  - 2 x daily - 7 x weekly - 1 sets - 10 reps - 5 hold  - Hooklying Rib Cage Breathing  - 2 x daily - 7 x weekly - 1 sets - 10 reps - 5 hold  - Supine Figure 4 Piriformis Stretch  - 2 x daily - 7 x weekly - 1 sets - 5 reps - 10 hold  - Supine Posterior Pelvic Tilt  - 2 x daily - 7 x weekly - 1 sets - 10 reps - 5 hold  - Supine March  - 2 x daily - 7 x weekly - 1 sets - 10 reps  - Supine Bridge  - 2 x daily - 7 x weekly - 1 sets - 10 reps  - Clamshell  - 2 x daily - 7 x weekly - 1 sets - 10 " reps  - Single Leg Balance with Clock Reach  - 2 x daily - 7 x weekly - 1 sets - 10 reps    Goals:  1. Pain 0/10  2. Oswestry: 10%  3. Independent activation of TA with all functional mobility to allow the patient to perform bed mobility without increased sx.  4. No reports of TTP along QL or intercostals to allow patient to lift/carry > 10# without increased sx.  5. Demonstrates independence with HEP.

## 2023-10-10 ENCOUNTER — TREATMENT (OUTPATIENT)
Dept: PHYSICAL THERAPY | Facility: CLINIC | Age: 58
End: 2023-10-10
Payer: COMMERCIAL

## 2023-10-10 DIAGNOSIS — R10.9 FLANK PAIN: Primary | ICD-10-CM

## 2023-10-10 PROCEDURE — 97140 MANUAL THERAPY 1/> REGIONS: CPT | Mod: GP | Performed by: PHYSICAL THERAPIST

## 2023-10-10 PROCEDURE — 97110 THERAPEUTIC EXERCISES: CPT | Mod: GP | Performed by: PHYSICAL THERAPIST

## 2023-10-10 ASSESSMENT — PAIN SCALES - GENERAL: PAINLEVEL_OUTOF10: 3

## 2023-10-10 ASSESSMENT — PAIN - FUNCTIONAL ASSESSMENT: PAIN_FUNCTIONAL_ASSESSMENT: 0-10

## 2023-10-12 ENCOUNTER — APPOINTMENT (OUTPATIENT)
Dept: PHYSICAL THERAPY | Facility: CLINIC | Age: 58
End: 2023-10-12
Payer: COMMERCIAL

## 2023-10-18 DIAGNOSIS — F41.9 ANXIETY: ICD-10-CM

## 2023-10-18 DIAGNOSIS — I10 HYPERTENSION, UNSPECIFIED TYPE: ICD-10-CM

## 2023-10-18 RX ORDER — ATORVASTATIN CALCIUM 20 MG/1
20 TABLET, FILM COATED ORAL DAILY
Qty: 30 TABLET | Refills: 5 | Status: SHIPPED | OUTPATIENT
Start: 2023-10-18 | End: 2024-04-12 | Stop reason: SDUPTHER

## 2023-10-18 RX ORDER — SERTRALINE HYDROCHLORIDE 50 MG/1
50 TABLET, FILM COATED ORAL DAILY
Qty: 30 TABLET | Refills: 5 | Status: SHIPPED | OUTPATIENT
Start: 2023-10-18 | End: 2024-04-12 | Stop reason: SDUPTHER

## 2023-10-18 RX ORDER — SERTRALINE HYDROCHLORIDE 25 MG/1
25 TABLET, FILM COATED ORAL DAILY
Qty: 30 TABLET | Refills: 5 | Status: SHIPPED | OUTPATIENT
Start: 2023-10-18 | End: 2024-04-12 | Stop reason: SDUPTHER

## 2023-10-18 NOTE — PROGRESS NOTES
"Physical Therapy Treatment    Patient Name: Severino Man  MRN: [id number]  Today's Date: 10/19/2023  Time Calculation  Start Time: 0835  Stop Time: 0916  Time Calculation (min): 41 min    Insurance:  Visit:  5 of 20  Auth required: No    Assessment:  Reports relief with stretches for HEP.  Progressed LE strengthening without increased sx.    Plan:  Progress to HEP.    Current Problem  1. Flank pain              Subjective   Had increased soreness after last TPN into groin for about 2 days.  States the hip hike exercise made his side uncomfortable so he discontinued that.  New hip exercises have increased soreness in glutes but assumes that bc he's using muscles differently.  Inquiring about stretches to supplement the strengthening.  Has not increased walking yet bc he was apprehensive regarding his already increased soreness.     Pain  Pain Assessment: 0-10  Pain Score: 3  Pain Location: Back    Objective   Not formally assessed      Treatments:  THERAPEUTIC EXERCISE:  Recumbent bike seat 11 random level 1 for 5mins  Seated fig 4 20\" ea  Seated HS stretch 20\" ea  LTR +fig 4 20\"x2 ea  LTR 20x  Bridges with Blue TB 10x  TG L5 SDR 6 B press 10x2  Mini squat training for HEP  QL hip hike review- pain  Side bend stretch at door frame    MANUAL THERAPY:      HEP   Access Code: [email or web address]  URL: [email or web address]  Date: 10/19/2023  Prepared by: Che Hathaway    Exercises  - Supine Transversus Abdominis Bracing - Hands on Stomach  - 2 x daily - 7 x weekly - 1 sets - 10 reps - 5 hold  - Supine Lower Trunk Rotation  - 2 x daily - 7 x weekly - 1 sets - 10 reps  - Thoracic Sidebending with Towel Roll  - 2 x daily - 7 x weekly - 1 sets - 10 reps - 5 hold  - Hooklying Rib Cage Breathing  - 2 x daily - 7 x weekly - 1 sets - 10 reps - 5 hold  - Supine Figure 4 Piriformis Stretch  - 2 x daily - 7 x weekly - 1 sets - 5 reps - 10 hold  - Supine Posterior Pelvic Tilt  - 2 x daily - 7 x weekly - 1 sets - 10 reps " - 5 hold  - Supine March  - 2 x daily - 7 x weekly - 1 sets - 10 reps  - Supine Bridge  - 2 x daily - 7 x weekly - 1 sets - 10 reps  - Clamshell  - 2 x daily - 7 x weekly - 1 sets - 10 reps  - Single Leg Balance with Clock Reach  - 2 x daily - 7 x weekly - 1 sets - 10 reps  - Hip Hiking on Step  - 2 x daily - 7 x weekly - 1 sets - 10 reps  - Side Stepping with Resistance at Feet  - 2 x daily - 7 x weekly - 1 sets - 10 reps  - Seated Figure 4 Piriformis Stretch  - 2 x daily - 7 x weekly - 1 sets - 3 reps - 20 hold  - Seated Hamstring Stretch  - 2 x daily - 7 x weekly - 1 sets - 3 reps - 20 hold  - Mini Squat with Counter Support  - 2 x daily - 7 x weekly - 1 sets - 10 reps      Goals:  1. Pain 0/10  2. Oswestry: 10%  3. Independent activation of TA with all functional mobility to allow the patient to perform bed mobility without increased sx.  4. No reports of TTP along QL or intercostals to allow patient to lift/carry > 10# without increased sx.  5. Demonstrates independence with HEP.

## 2023-10-19 ENCOUNTER — TREATMENT (OUTPATIENT)
Dept: PHYSICAL THERAPY | Facility: CLINIC | Age: 58
End: 2023-10-19
Payer: COMMERCIAL

## 2023-10-19 DIAGNOSIS — R10.9 FLANK PAIN: Primary | ICD-10-CM

## 2023-10-19 PROCEDURE — 97110 THERAPEUTIC EXERCISES: CPT | Mod: GP | Performed by: PHYSICAL THERAPIST

## 2023-10-19 ASSESSMENT — PAIN SCALES - GENERAL: PAINLEVEL_OUTOF10: 3

## 2023-10-19 ASSESSMENT — PAIN - FUNCTIONAL ASSESSMENT: PAIN_FUNCTIONAL_ASSESSMENT: 0-10

## 2023-10-26 ENCOUNTER — TREATMENT (OUTPATIENT)
Dept: PHYSICAL THERAPY | Facility: CLINIC | Age: 58
End: 2023-10-26
Payer: COMMERCIAL

## 2023-10-26 DIAGNOSIS — R10.9 FLANK PAIN: Primary | ICD-10-CM

## 2023-10-26 PROCEDURE — 97110 THERAPEUTIC EXERCISES: CPT | Mod: GP | Performed by: PHYSICAL THERAPIST

## 2023-10-26 ASSESSMENT — PAIN - FUNCTIONAL ASSESSMENT: PAIN_FUNCTIONAL_ASSESSMENT: 0-10

## 2023-10-26 ASSESSMENT — PAIN SCALES - GENERAL: PAINLEVEL_OUTOF10: 0 - NO PAIN

## 2023-10-26 NOTE — PROGRESS NOTES
"Physical Therapy Treatment    Patient Name: Severino Man  MRN: [id number]  Today's Date: 10/26/2023  Time Calculation  Start Time: 0835  Stop Time: 0916  Time Calculation (min): 41 min    Insurance:  Visit:  6 of 20  Auth required: No    Assessment:  Demonstrates understanding of HEP.    Plan:  Demonstrates ability to continue with independent home program.  Discharge to [facility].    Current Problem  1. Flank pain            Subjective   Has increased activity without increased sx.  Feels better than he thought he was capable of.    Pain  Pain Assessment: 0-10  Pain Score: 0 - No pain  Pain Location: Back    Objective   Modified Oswestry: 0%      Treatments:  THERAPEUTIC EXERCISE:  Recumbent bike seat 11 random level 1 for 5mins  LTR +fig 4 20\"x2 ea  LTR 20x  Bridges with Blue TB 10x  Seated HS stretch 20\" ea  TG L6 SDR 6 B press 10x2      HEP   Access Code: [email or web address]  URL: [email or web address]  Date: 10/19/2023  Prepared by: Che Hathaway    Exercises  - Supine Transversus Abdominis Bracing - Hands on Stomach  - 2 x daily - 7 x weekly - 1 sets - 10 reps - 5 hold  - Supine Lower Trunk Rotation  - 2 x daily - 7 x weekly - 1 sets - 10 reps  - Thoracic Sidebending with Towel Roll  - 2 x daily - 7 x weekly - 1 sets - 10 reps - 5 hold  - Hooklying Rib Cage Breathing  - 2 x daily - 7 x weekly - 1 sets - 10 reps - 5 hold  - Supine Figure 4 Piriformis Stretch  - 2 x daily - 7 x weekly - 1 sets - 5 reps - 10 hold  - Supine Posterior Pelvic Tilt  - 2 x daily - 7 x weekly - 1 sets - 10 reps - 5 hold  - Supine March  - 2 x daily - 7 x weekly - 1 sets - 10 reps  - Supine Bridge  - 2 x daily - 7 x weekly - 1 sets - 10 reps  - Clamshell  - 2 x daily - 7 x weekly - 1 sets - 10 reps  - Single Leg Balance with Clock Reach  - 2 x daily - 7 x weekly - 1 sets - 10 reps  - Hip Hiking on Step  - 2 x daily - 7 x weekly - 1 sets - 10 reps  - Side Stepping with Resistance at Feet  - 2 x daily - 7 x weekly - 1 sets - 10 " reps  - Seated Figure 4 Piriformis Stretch  - 2 x daily - 7 x weekly - 1 sets - 3 reps - 20 hold  - Seated Hamstring Stretch  - 2 x daily - 7 x weekly - 1 sets - 3 reps - 20 hold  - Mini Squat with Counter Support  - 2 x daily - 7 x weekly - 1 sets - 10 reps      Goals:  1. Pain 0/10  2. Oswestry: 10%  3. Independent activation of TA with all functional mobility to allow the patient to perform bed mobility without increased sx.  4. No reports of TTP along QL or intercostals to allow patient to lift/carry > 10# without increased sx.  5. Demonstrates independence with HEP.    Goals addressed.  Met 5/5 goals

## 2024-01-18 DIAGNOSIS — I10 HYPERTENSION, UNSPECIFIED TYPE: ICD-10-CM

## 2024-01-18 DIAGNOSIS — B20 AIDS (MULTI): ICD-10-CM

## 2024-01-18 DIAGNOSIS — M50.30 DISC DISEASE, DEGENERATIVE, CERVICAL: ICD-10-CM

## 2024-01-18 RX ORDER — DARUNAVIR 800 MG/1
800 TABLET, FILM COATED ORAL
Qty: 30 TABLET | Refills: 5 | Status: SHIPPED | OUTPATIENT
Start: 2024-01-18

## 2024-01-18 RX ORDER — ELVITEGRAVIR, COBICISTAT, EMTRICITABINE, AND TENOFOVIR ALAFENAMIDE 150; 150; 200; 10 MG/1; MG/1; MG/1; MG/1
1 TABLET ORAL DAILY
Qty: 30 TABLET | Refills: 5 | Status: SHIPPED | OUTPATIENT
Start: 2024-01-18

## 2024-01-18 RX ORDER — NAPROXEN 500 MG/1
500 TABLET ORAL
Qty: 60 TABLET | Refills: 5 | Status: SHIPPED | OUTPATIENT
Start: 2024-01-18

## 2024-01-18 RX ORDER — HYDROCHLOROTHIAZIDE 12.5 MG/1
12.5 TABLET ORAL DAILY
Qty: 30 TABLET | Refills: 5 | Status: SHIPPED | OUTPATIENT
Start: 2024-01-18

## 2024-02-13 DIAGNOSIS — K21.9 GASTROESOPHAGEAL REFLUX DISEASE, UNSPECIFIED WHETHER ESOPHAGITIS PRESENT: ICD-10-CM

## 2024-02-13 DIAGNOSIS — I10 HYPERTENSION, UNSPECIFIED TYPE: ICD-10-CM

## 2024-02-13 RX ORDER — LANSOPRAZOLE 30 MG/1
30 CAPSULE, DELAYED RELEASE ORAL 2 TIMES DAILY
Qty: 60 CAPSULE | Refills: 5 | Status: SHIPPED | OUTPATIENT
Start: 2024-02-13

## 2024-02-13 RX ORDER — LISINOPRIL 10 MG/1
10 TABLET ORAL DAILY
Qty: 30 TABLET | Refills: 5 | Status: SHIPPED | OUTPATIENT
Start: 2024-02-13

## 2024-03-06 DIAGNOSIS — B20 AIDS (MULTI): ICD-10-CM

## 2024-03-11 ENCOUNTER — LAB (OUTPATIENT)
Dept: LAB | Facility: LAB | Age: 59
End: 2024-03-11
Payer: COMMERCIAL

## 2024-03-11 DIAGNOSIS — B20 AIDS (MULTI): ICD-10-CM

## 2024-03-11 LAB
ALBUMIN SERPL BCP-MCNC: 4.2 G/DL (ref 3.4–5)
ALP SERPL-CCNC: 47 U/L (ref 33–120)
ALT SERPL W P-5'-P-CCNC: 111 U/L (ref 10–52)
ANION GAP SERPL CALC-SCNC: 11 MMOL/L (ref 10–20)
AST SERPL W P-5'-P-CCNC: 37 U/L (ref 9–39)
BASOPHILS # BLD AUTO: 0.04 X10*3/UL (ref 0–0.1)
BASOPHILS NFR BLD AUTO: 0.4 %
BILIRUB SERPL-MCNC: 0.5 MG/DL (ref 0–1.2)
BUN SERPL-MCNC: 22 MG/DL (ref 6–23)
CALCIUM SERPL-MCNC: 9.7 MG/DL (ref 8.6–10.3)
CHLORIDE SERPL-SCNC: 102 MMOL/L (ref 98–107)
CO2 SERPL-SCNC: 26 MMOL/L (ref 21–32)
CREAT SERPL-MCNC: 0.94 MG/DL (ref 0.5–1.3)
EGFRCR SERPLBLD CKD-EPI 2021: >90 ML/MIN/1.73M*2
EOSINOPHIL # BLD AUTO: 0.17 X10*3/UL (ref 0–0.7)
EOSINOPHIL NFR BLD AUTO: 1.6 %
ERYTHROCYTE [DISTWIDTH] IN BLOOD BY AUTOMATED COUNT: 13.4 % (ref 11.5–14.5)
GLUCOSE SERPL-MCNC: 93 MG/DL (ref 74–99)
HCT VFR BLD AUTO: 46 % (ref 41–52)
HGB BLD-MCNC: 15.3 G/DL (ref 13.5–17.5)
IMM GRANULOCYTES # BLD AUTO: 0.16 X10*3/UL (ref 0–0.7)
IMM GRANULOCYTES NFR BLD AUTO: 1.5 % (ref 0–0.9)
LYMPHOCYTES # BLD AUTO: 3.47 X10*3/UL (ref 1.2–4.8)
LYMPHOCYTES NFR BLD AUTO: 32.8 %
MCH RBC QN AUTO: 29.3 PG (ref 26–34)
MCHC RBC AUTO-ENTMCNC: 33.3 G/DL (ref 32–36)
MCV RBC AUTO: 88 FL (ref 80–100)
MONOCYTES # BLD AUTO: 1.01 X10*3/UL (ref 0.1–1)
MONOCYTES NFR BLD AUTO: 9.6 %
NEUTROPHILS # BLD AUTO: 5.72 X10*3/UL (ref 1.2–7.7)
NEUTROPHILS NFR BLD AUTO: 54.1 %
NRBC BLD-RTO: 0 /100 WBCS (ref 0–0)
PLATELET # BLD AUTO: 268 X10*3/UL (ref 150–450)
POTASSIUM SERPL-SCNC: 4 MMOL/L (ref 3.5–5.3)
PROT SERPL-MCNC: 6.3 G/DL (ref 6.4–8.2)
RBC # BLD AUTO: 5.23 X10*6/UL (ref 4.5–5.9)
SODIUM SERPL-SCNC: 135 MMOL/L (ref 136–145)
TREPONEMA PALLIDUM IGG+IGM AB [PRESENCE] IN SERUM OR PLASMA BY IMMUNOASSAY: NONREACTIVE
WBC # BLD AUTO: 10.6 X10*3/UL (ref 4.4–11.3)

## 2024-03-11 PROCEDURE — 85025 COMPLETE CBC W/AUTO DIFF WBC: CPT

## 2024-03-11 PROCEDURE — 86780 TREPONEMA PALLIDUM: CPT

## 2024-03-11 PROCEDURE — 88184 FLOWCYTOMETRY/ TC 1 MARKER: CPT

## 2024-03-11 PROCEDURE — 87536 HIV-1 QUANT&REVRSE TRNSCRPJ: CPT

## 2024-03-11 PROCEDURE — 36415 COLL VENOUS BLD VENIPUNCTURE: CPT

## 2024-03-11 PROCEDURE — 80053 COMPREHEN METABOLIC PANEL: CPT

## 2024-03-11 PROCEDURE — 88185 FLOWCYTOMETRY/TC ADD-ON: CPT

## 2024-03-12 LAB
CD3+CD4+ CELLS # BLD: 0.62 X10E9/L
CD3+CD4+ CELLS # BLD: 625 /MM3
CD3+CD4+ CELLS NFR BLD: 18 %
CD3+CD4+ CELLS/CD3+CD8+ CLL BLD: 0.34 %
CD3+CD8+ CELLS # BLD: 1.84 X10E9/L
CD3+CD8+ CELLS NFR BLD: 53 %
HIV1 RNA # PLAS NAA DL=20: NOT DETECTED {COPIES}/ML
HIV1 RNA SPEC NAA+PROBE-LOG#: NORMAL {LOG_COPIES}/ML
LYMPHOCYTES # SPEC AUTO: 3.47 X10*3/UL

## 2024-04-08 ENCOUNTER — TELEPHONE (OUTPATIENT)
Dept: IMMUNOLOGY | Facility: CLINIC | Age: 59
End: 2024-04-08
Payer: COMMERCIAL

## 2024-04-09 ENCOUNTER — OFFICE VISIT (OUTPATIENT)
Dept: IMMUNOLOGY | Facility: CLINIC | Age: 59
End: 2024-04-09
Payer: COMMERCIAL

## 2024-04-09 VITALS
BODY MASS INDEX: 38.13 KG/M2 | RESPIRATION RATE: 18 BRPM | SYSTOLIC BLOOD PRESSURE: 115 MMHG | DIASTOLIC BLOOD PRESSURE: 76 MMHG | OXYGEN SATURATION: 98 % | WEIGHT: 289 LBS | HEART RATE: 86 BPM

## 2024-04-09 DIAGNOSIS — I10 HYPERTENSION, UNSPECIFIED TYPE: ICD-10-CM

## 2024-04-09 DIAGNOSIS — I77.810 DILATED AORTIC ROOT (CMS-HCC): ICD-10-CM

## 2024-04-09 DIAGNOSIS — R74.01 ELEVATED ALT MEASUREMENT: ICD-10-CM

## 2024-04-09 DIAGNOSIS — I77.810 AORTIC ROOT DILATATION (CMS-HCC): ICD-10-CM

## 2024-04-09 DIAGNOSIS — B20 AIDS (MULTI): Primary | ICD-10-CM

## 2024-04-09 LAB
ALBUMIN SERPL BCP-MCNC: 4.3 G/DL (ref 3.4–5)
ALP SERPL-CCNC: 47 U/L (ref 33–120)
ALT SERPL W P-5'-P-CCNC: 61 U/L (ref 10–52)
ANION GAP SERPL CALC-SCNC: 16 MMOL/L (ref 10–20)
AST SERPL W P-5'-P-CCNC: 29 U/L (ref 9–39)
BILIRUB SERPL-MCNC: 0.5 MG/DL (ref 0–1.2)
BUN SERPL-MCNC: 16 MG/DL (ref 6–23)
CALCIUM SERPL-MCNC: 10 MG/DL (ref 8.6–10.6)
CHLORIDE SERPL-SCNC: 104 MMOL/L (ref 98–107)
CK SERPL-CCNC: 189 U/L (ref 0–325)
CO2 SERPL-SCNC: 23 MMOL/L (ref 21–32)
CREAT SERPL-MCNC: 1.02 MG/DL (ref 0.5–1.3)
EGFRCR SERPLBLD CKD-EPI 2021: 85 ML/MIN/1.73M*2
EST. AVERAGE GLUCOSE BLD GHB EST-MCNC: 108 MG/DL
GLUCOSE SERPL-MCNC: 69 MG/DL (ref 74–99)
HBA1C MFR BLD: 5.4 %
HCV AB SER QL: NONREACTIVE
POTASSIUM SERPL-SCNC: 4.1 MMOL/L (ref 3.5–5.3)
PROT SERPL-MCNC: 6.8 G/DL (ref 6.4–8.2)
SODIUM SERPL-SCNC: 139 MMOL/L (ref 136–145)

## 2024-04-09 PROCEDURE — 3078F DIAST BP <80 MM HG: CPT | Performed by: INTERNAL MEDICINE

## 2024-04-09 PROCEDURE — 83036 HEMOGLOBIN GLYCOSYLATED A1C: CPT | Performed by: INTERNAL MEDICINE

## 2024-04-09 PROCEDURE — 3074F SYST BP LT 130 MM HG: CPT | Performed by: INTERNAL MEDICINE

## 2024-04-09 PROCEDURE — 36415 COLL VENOUS BLD VENIPUNCTURE: CPT | Performed by: INTERNAL MEDICINE

## 2024-04-09 PROCEDURE — 86803 HEPATITIS C AB TEST: CPT | Performed by: INTERNAL MEDICINE

## 2024-04-09 PROCEDURE — 80053 COMPREHEN METABOLIC PANEL: CPT | Performed by: INTERNAL MEDICINE

## 2024-04-09 PROCEDURE — 82550 ASSAY OF CK (CPK): CPT | Performed by: INTERNAL MEDICINE

## 2024-04-09 PROCEDURE — 99214 OFFICE O/P EST MOD 30 MIN: CPT | Performed by: INTERNAL MEDICINE

## 2024-04-09 PROCEDURE — 1036F TOBACCO NON-USER: CPT | Performed by: INTERNAL MEDICINE

## 2024-04-09 ASSESSMENT — PAIN SCALES - GENERAL: PAINLEVEL: 4

## 2024-04-09 NOTE — PROGRESS NOTES
"Subjective   Severino Man is a 58 y.o. male who presents to the [facility] for follow-up of HIV infection.   In general doing well  Has had recurrence of his mid back pain, going back to his PT which really helped before.  Going to plastic surgeon to get his neck lipoma removed-wanted to be sure ok and I endorsed.  Has had strange episodes where feels like\"blood out of my arms\" best described as feeling when BP cuff on and tight. Thinks whole arm but not sure. No change in color or temperature. No apparent trigger  Objective   Vitals:    04/09/24 0926   BP: 115/76   Pulse: 86   Resp: 18   SpO2: 98%        Current Outpatient Medications:     aspirin 325 mg tablet, Take 1 tablet (325 mg) by mouth once daily., Disp: , Rfl:     atorvastatin (Lipitor) 20 mg tablet, Take 1 tablet (20 mg) by mouth once daily., Disp: 30 tablet, Rfl: 5    darunavir (Prezista) 800 mg tablet, Take 1 tablet (800 mg) by mouth once daily with breakfast., Disp: 30 tablet, Rfl: 5    elviteg-cob-emtri-tenof ALAFEN (Genvoya) [phone] mg tablet, Take 1 tablet by mouth once daily., Disp: 30 tablet, Rfl: 5    hydroCHLOROthiazide (HYDRODiuril) 12.5 mg tablet, Take 1 tablet (12.5 mg) by mouth once daily., Disp: 30 tablet, Rfl: 5    ketoconazole (NIZOral) 2 % shampoo, Apply topically 3 (three) times a week. Apply to scalp every other day for 5 minutes and rinse, Disp: , Rfl:     lansoprazole (Prevacid) 30 mg DR capsule, Take 1 capsule (30 mg) by mouth 2 times a day., Disp: 60 capsule, Rfl: 5    lisinopril 10 mg tablet, Take 1 tablet (10 mg) by mouth once daily., Disp: 30 tablet, Rfl: 5    magnesium gluconate (Magonate) 27.5 mg magne- sium (500 mg) tablet, Take 1 tablet (27.5 mg) by mouth 2 times a day., Disp: , Rfl:     melatonin 3 mg tablet, Take 5 mg by mouth once daily at bedtime., Disp: , Rfl:     naproxen (Naprosyn) 500 mg tablet, Take 1 tablet (500 mg) by mouth 2 times a day with meals., Disp: 60 tablet, Rfl: 5    sertraline (Zoloft) 25 mg tablet, " Take 1 tablet (25 mg) by mouth once daily., Disp: 30 tablet, Rfl: 5    sertraline (Zoloft) 50 mg tablet, Take 1 tablet (50 mg) by mouth once daily., Disp: 30 tablet, Rfl: 5    triamcinolone (Kenalog) 0.1 % cream, Apply topically twice a day., Disp: , Rfl:    Physical Exam  Physical Exam  Constitutional:       General: not in acute distress.     Appearance: Normal appearance.   HENT:      Head: Normocephalic and atraumatic.      Pharynx: Oropharynx is clear. No oropharyngeal exudate.   Eyes:      General: No scleral icterus.  Cardiovascular:      Rate and Rhythm: Normal rate and regular rhythm.   Pulmonary:      Breath sounds: Normal breath sounds. No wheezing or rhonchi.   Abdominal:      Palpations: Abdomen is soft.      Tenderness: There is no abdominal tenderness.   Musculoskeletal:      Cervical back: Neck supple.      Right lower leg: No edema.      Left lower leg: No edema.   Skin:     Findings: No rash.   Neurological:      Mental Status: alert.   Psychiatric:         Mood and Affect: Mood normal.     Laboratory  Lab Results   Component Value Date    VZQ2UFPAY NOT DETECTED 06/06/2023    ZU2LXR0SKCP 0.625 03/11/2024      Lab Results   Component Value Date    WBC 10.6 03/11/2024    HGB 15.3 03/11/2024    HCT 46.0 03/11/2024    MCV 88 03/11/2024     03/11/2024      Lab Results   Component Value Date    GLUCOSE 69 (L) 04/09/2024    CALCIUM 10.0 04/09/2024     04/09/2024    K 4.1 04/09/2024    CO2 23 04/09/2024     04/09/2024    BUN 16 04/09/2024    CREATININE 1.02 04/09/2024      Lab Results   Component Value Date    CHOL 147 10/03/2023    CHOL 133 01/10/2023    CHOL 154 04/12/2022     Lab Results   Component Value Date    HDL 42.2 10/03/2023    HDL 37.4 (A) 01/10/2023    HDL 36.5 (A) 04/12/2022     Lab Results   Component Value Date    LDLCALC 84 (L) 10/03/2023     Lab Results   Component Value Date    TRIG 105 10/03/2023    TRIG 71 01/10/2023    TRIG 110 04/12/2022     No components found  "for: \"CHOLHDL\"      Assessment/Plan   Problem List Items Addressed This Visit       AIDS (CMS/HCC) - Primary    Current Assessment & Plan     UD with good CD4, continue current ART         Aortic root dilatation (CMS/HCC)    Current Assessment & Plan     Will check echo again-ilan with the strange arm symptoms to ensure no worsening         Hypertension    Current Assessment & Plan     Good today          Relevant Orders    Hemoglobin A1C (Completed)    Elevated ALT measurement    Current Assessment & Plan     Elevated ALT on labs last month, will repeat. Check hep C Ab , is immune to B and A         Relevant Orders    Comprehensive metabolic panel (Completed)    Hepatitis C Antibody (Completed)    CK (Completed)     Other Visit Diagnoses       Dilated aortic root (CMS/HCC)        Relevant Orders    Transthoracic Echo (TTE) Complete           Health Maintenance    Becca Lacy MD   "

## 2024-04-10 ENCOUNTER — TREATMENT (OUTPATIENT)
Dept: PHYSICAL THERAPY | Facility: CLINIC | Age: 59
End: 2024-04-10

## 2024-04-10 DIAGNOSIS — R10.9 FLANK PAIN: Primary | ICD-10-CM

## 2024-04-10 PROCEDURE — 4200000004 HC PT PHASE II 15 MIN CHG: Mod: GP | Performed by: PHYSICAL THERAPIST

## 2024-04-10 ASSESSMENT — PAIN SCALES - GENERAL: PAINLEVEL_OUTOF10: 3

## 2024-04-10 ASSESSMENT — PAIN - FUNCTIONAL ASSESSMENT: PAIN_FUNCTIONAL_ASSESSMENT: 0-10

## 2024-04-10 NOTE — PROGRESS NOTES
Physical Therapy Treatment    Patient Name: Severino Man  MRN: [id number]  Today's Date: 4/10/2024  Time Calculation  Start Time: 1720  Stop Time: 1730  Time Calculation (min): 10 min    Insurance:  Visit:  1 of self pay phase II  Auth required: No  Payor:  Self pay phase II    Assessment:   Able to elicit twitch response with TPN.  Reports relief post tx.    Plan:   Continue with Phase II TPN visits x4 total to allow patient to return to exercise without increased sx.    Current Problem  1. Flank pain            General  General  Reason for Referral: R flank pain  Referred By: Self    Subjective    Presents today for Phase II self pay program for TPN.  Patient well known to this office.  Greatly benefited from TPN in the fall for R flank pain.  Has continued with HEP and doing well but states pain has started to creep back in over the last few weeks and is limiting his ability to be active.    Precautions  Precautions  Medical Precautions: No known precautions/limitation  Pain  Pain Assessment  Pain Assessment: 0-10  Pain Score: 3  Pain Location: Back  Pain Orientation: Lower, Right    Objective   TTP R QL    TREATMENT  MANUAL THERAPY:  Patient was educated on risks and benefits associated with trigger point needling, what to expect, and post procedure protocol (i.e. increased water intake, increased stretching, use of heat, etc.).  Patient educated on s/s associated with pneumothorax and to go to ED if patient were to start experiencing these after being needled in thorax region.  Patient was also educated on modifications to HEP.  Verbal consent was received to proceed with treatment.  TPN: R QL with 100mm x1    OP EDUCATION:   Patient education on diagnosis/prognosis, pathophysiology, POC, and HEP.  Patient demonstrates understanding of HEP/POC.    Goals:  No notable TTP to allow patient to return to walking program without increased sx.

## 2024-04-12 DIAGNOSIS — I10 HYPERTENSION, UNSPECIFIED TYPE: ICD-10-CM

## 2024-04-12 DIAGNOSIS — F41.9 ANXIETY: ICD-10-CM

## 2024-04-12 RX ORDER — SERTRALINE HYDROCHLORIDE 25 MG/1
25 TABLET, FILM COATED ORAL DAILY
Qty: 30 TABLET | Refills: 5 | Status: SHIPPED | OUTPATIENT
Start: 2024-04-12 | End: 2024-10-09

## 2024-04-12 RX ORDER — SERTRALINE HYDROCHLORIDE 50 MG/1
50 TABLET, FILM COATED ORAL DAILY
Qty: 30 TABLET | Refills: 5 | Status: SHIPPED | OUTPATIENT
Start: 2024-04-12 | End: 2024-10-09

## 2024-04-12 RX ORDER — ATORVASTATIN CALCIUM 20 MG/1
20 TABLET, FILM COATED ORAL DAILY
Qty: 30 TABLET | Refills: 5 | Status: SHIPPED | OUTPATIENT
Start: 2024-04-12 | End: 2024-10-09

## 2024-04-18 ENCOUNTER — TREATMENT (OUTPATIENT)
Dept: PHYSICAL THERAPY | Facility: CLINIC | Age: 59
End: 2024-04-18
Payer: COMMERCIAL

## 2024-04-18 DIAGNOSIS — R10.9 FLANK PAIN: Primary | ICD-10-CM

## 2024-04-18 PROCEDURE — 4200000004 HC PT PHASE II 15 MIN CHG: Mod: GP | Performed by: PHYSICAL THERAPIST

## 2024-04-18 ASSESSMENT — PAIN - FUNCTIONAL ASSESSMENT: PAIN_FUNCTIONAL_ASSESSMENT: 0-10

## 2024-04-18 ASSESSMENT — PAIN SCALES - GENERAL: PAINLEVEL_OUTOF10: 7

## 2024-04-18 NOTE — PROGRESS NOTES
Physical Therapy Treatment    Patient Name: Severino Man  MRN: [id number]  Today's Date: 4/18/2024  Time Calculation  Start Time: 1305  Stop Time: 1318  Time Calculation (min): 13 min    Insurance:  Visit:  2 of self pay phase II  Auth required: No  Payor:  Self pay phase II    Assessment:   Able to elicit twitch response with TPN.  Reports relief post tx.    Plan:   Continue with Phase II TPN visits x3.    Current Problem  1. Flank pain              General  General  Reason for Referral: R flank pain  Referred By: Self    Subjective    Had substantial relief for 6 days from last session.  Pain is back to same level is was before.  Hasn't been able to get out walking as much this week.    Precautions  Precautions  Medical Precautions: No known precautions/limitation  Pain  Pain Assessment  Pain Assessment: 0-10  Pain Score: 7  Pain Location: Back  Pain Orientation: Lower, Right    Objective   TTP R QL; R multifidus from T11-L1    TREATMENT  MANUAL THERAPY:  TPN: R multifidus with 60mm at T12 and L1; R QL with 100mm x1    OP EDUCATION:   Patient education on diagnosis/prognosis, pathophysiology, POC, and HEP.  Patient demonstrates understanding of HEP/POC.    Goals:  No notable TTP to allow patient to return to walking program without increased sx.

## 2024-04-24 ENCOUNTER — HOSPITAL ENCOUNTER (OUTPATIENT)
Dept: CARDIOLOGY | Facility: CLINIC | Age: 59
Discharge: HOME | End: 2024-04-24
Payer: COMMERCIAL

## 2024-04-24 VITALS
BODY MASS INDEX: 38.3 KG/M2 | WEIGHT: 289 LBS | SYSTOLIC BLOOD PRESSURE: 142 MMHG | HEIGHT: 73 IN | DIASTOLIC BLOOD PRESSURE: 74 MMHG

## 2024-04-24 DIAGNOSIS — I77.810 DILATED AORTIC ROOT (CMS-HCC): ICD-10-CM

## 2024-04-24 PROCEDURE — 93325 DOPPLER ECHO COLOR FLOW MAPG: CPT | Performed by: INTERNAL MEDICINE

## 2024-04-24 PROCEDURE — 93321 DOPPLER ECHO F-UP/LMTD STD: CPT | Performed by: INTERNAL MEDICINE

## 2024-04-24 PROCEDURE — 93308 TTE F-UP OR LMTD: CPT | Performed by: INTERNAL MEDICINE

## 2024-04-24 PROCEDURE — 93325 DOPPLER ECHO COLOR FLOW MAPG: CPT

## 2024-04-25 ENCOUNTER — TREATMENT (OUTPATIENT)
Dept: PHYSICAL THERAPY | Facility: CLINIC | Age: 59
End: 2024-04-25

## 2024-04-25 DIAGNOSIS — R10.9 FLANK PAIN: Primary | ICD-10-CM

## 2024-04-25 LAB
AORTIC VALVE MEAN GRADIENT: 7 MMHG
AORTIC VALVE PEAK VELOCITY: 1.82 M/S
AV PEAK GRADIENT: 13.2 MMHG
AVA (PEAK VEL): 2.9 CM2
AVA (VTI): 2.98 CM2
EJECTION FRACTION APICAL 4 CHAMBER: 60.7
LEFT VENTRICLE INTERNAL DIMENSION DIASTOLE: 4.8 CM (ref 3.5–6)
LEFT VENTRICULAR OUTFLOW TRACT DIAMETER: 2.2 CM
MITRAL VALVE E/A RATIO: 0.82
MITRAL VALVE E/E' RATIO: 8.1
RIGHT VENTRICLE PEAK SYSTOLIC PRESSURE: 31.9 MMHG

## 2024-04-25 PROCEDURE — 4200000004 HC PT PHASE II 15 MIN CHG: Mod: GP | Performed by: PHYSICAL THERAPIST

## 2024-04-25 ASSESSMENT — PAIN - FUNCTIONAL ASSESSMENT: PAIN_FUNCTIONAL_ASSESSMENT: 0-10

## 2024-04-25 ASSESSMENT — PAIN SCALES - GENERAL: PAINLEVEL_OUTOF10: 5 - MODERATE PAIN

## 2024-04-25 NOTE — PROGRESS NOTES
Physical Therapy Treatment    Patient Name: Severino Man  MRN: [id number]  Today's Date: 4/25/2024  Time Calculation  Start Time: 1306  Stop Time: 1316  Time Calculation (min): 10 min    Insurance:  Visit:  3 of self pay phase II  Auth required: No  Payor:  Self pay phase II    Assessment:   Able to elicit twitch response with TPN.  Reports relief post tx.    Plan:   Continue with Phase II TPN visits x1.    Current Problem  1. Flank pain            General  General  Reason for Referral: R flank pain  Referred By: Self    Subjective    Feels like he was able to have slight improved response from last session.  Hasn't resumed walking but did get gym membership and plans to start TM walking this week.  Most pain/tightness along spine.    Precautions  Precautions  Medical Precautions: No known precautions/limitation  Pain  Pain Assessment  Pain Assessment: 0-10  Pain Score: 5 - Moderate pain  Pain Location: Back  Pain Orientation: Lower, Right    Objective   R multifidus from T9-L1    TREATMENT  MANUAL THERAPY:  TPN: R multifidus with 60mm at T9-L1    OP EDUCATION:   Patient education on diagnosis/prognosis, pathophysiology, POC, and HEP.  Patient demonstrates understanding of HEP/POC.    Goals:  No notable TTP to allow patient to return to walking program without increased sx.

## 2024-05-02 ENCOUNTER — APPOINTMENT (OUTPATIENT)
Dept: PHYSICAL THERAPY | Facility: CLINIC | Age: 59
End: 2024-05-02

## 2024-07-24 DIAGNOSIS — M50.30 DISC DISEASE, DEGENERATIVE, CERVICAL: ICD-10-CM

## 2024-07-24 DIAGNOSIS — I10 HYPERTENSION, UNSPECIFIED TYPE: ICD-10-CM

## 2024-07-24 DIAGNOSIS — B20 AIDS (MULTI): ICD-10-CM

## 2024-07-24 RX ORDER — HYDROCHLOROTHIAZIDE 12.5 MG/1
12.5 TABLET ORAL DAILY
Qty: 30 TABLET | Refills: 5 | Status: SHIPPED | OUTPATIENT
Start: 2024-07-24

## 2024-07-24 RX ORDER — DARUNAVIR 800 MG/1
800 TABLET, FILM COATED ORAL
Qty: 30 TABLET | Refills: 5 | Status: SHIPPED | OUTPATIENT
Start: 2024-07-24

## 2024-07-24 RX ORDER — NAPROXEN 500 MG/1
500 TABLET ORAL 2 TIMES DAILY
Qty: 60 TABLET | Refills: 5 | Status: SHIPPED | OUTPATIENT
Start: 2024-07-24

## 2024-07-24 RX ORDER — ELVITEGRAVIR, COBICISTAT, EMTRICITABINE, AND TENOFOVIR ALAFENAMIDE 150; 150; 200; 10 MG/1; MG/1; MG/1; MG/1
1 TABLET ORAL DAILY
Qty: 30 TABLET | Refills: 5 | Status: SHIPPED | OUTPATIENT
Start: 2024-07-24

## 2024-08-08 ENCOUNTER — APPOINTMENT (OUTPATIENT)
Dept: PHYSICAL THERAPY | Facility: CLINIC | Age: 59
End: 2024-08-08
Payer: COMMERCIAL

## 2024-08-27 DIAGNOSIS — K21.9 GASTROESOPHAGEAL REFLUX DISEASE, UNSPECIFIED WHETHER ESOPHAGITIS PRESENT: ICD-10-CM

## 2024-08-27 DIAGNOSIS — I10 HYPERTENSION, UNSPECIFIED TYPE: ICD-10-CM

## 2024-08-27 RX ORDER — LANSOPRAZOLE 30 MG/1
30 CAPSULE, DELAYED RELEASE ORAL 2 TIMES DAILY
Qty: 60 CAPSULE | Refills: 5 | Status: SHIPPED | OUTPATIENT
Start: 2024-08-27

## 2024-08-27 RX ORDER — LISINOPRIL 10 MG/1
10 TABLET ORAL DAILY
Qty: 30 TABLET | Refills: 5 | Status: SHIPPED | OUTPATIENT
Start: 2024-08-27

## 2024-08-28 ENCOUNTER — DOCUMENTATION (OUTPATIENT)
Dept: IMMUNOLOGY | Facility: CLINIC | Age: 59
End: 2024-08-28
Payer: COMMERCIAL

## 2024-08-28 NOTE — PROGRESS NOTES
Prior authorization for lansoprazole 30 mg approved through 8/28/2025. Called/updated [facility] pharmacist.

## 2024-09-03 DIAGNOSIS — B20 AIDS (MULTI): ICD-10-CM

## 2024-09-03 DIAGNOSIS — E78.00 HYPERCHOLESTEROLEMIA: ICD-10-CM

## 2024-09-05 ENCOUNTER — LAB (OUTPATIENT)
Dept: LAB | Facility: LAB | Age: 59
End: 2024-09-05
Payer: COMMERCIAL

## 2024-09-05 DIAGNOSIS — E78.00 HYPERCHOLESTEROLEMIA: ICD-10-CM

## 2024-09-05 DIAGNOSIS — B20 AIDS (MULTI): ICD-10-CM

## 2024-09-05 LAB
ALBUMIN SERPL BCP-MCNC: 4.2 G/DL (ref 3.4–5)
ALP SERPL-CCNC: 46 U/L (ref 33–136)
ALT SERPL W P-5'-P-CCNC: 19 U/L (ref 10–52)
ANION GAP SERPL CALC-SCNC: 11 MMOL/L (ref 10–20)
AST SERPL W P-5'-P-CCNC: 13 U/L (ref 9–39)
BASOPHILS # BLD AUTO: 0.05 X10*3/UL (ref 0–0.1)
BASOPHILS # BLD AUTO: 0.06 X10*3/UL (ref 0–0.1)
BASOPHILS NFR BLD AUTO: 0.7 %
BASOPHILS NFR BLD AUTO: 0.8 %
BILIRUB SERPL-MCNC: 0.5 MG/DL (ref 0–1.2)
BUN SERPL-MCNC: 23 MG/DL (ref 6–23)
CALCIUM SERPL-MCNC: 9.5 MG/DL (ref 8.6–10.3)
CHLORIDE SERPL-SCNC: 105 MMOL/L (ref 98–107)
CHOLEST SERPL-MCNC: 169 MG/DL (ref 0–199)
CHOLESTEROL/HDL RATIO: 4.4
CO2 SERPL-SCNC: 25 MMOL/L (ref 21–32)
CREAT SERPL-MCNC: 0.92 MG/DL (ref 0.5–1.3)
EGFRCR SERPLBLD CKD-EPI 2021: >90 ML/MIN/1.73M*2
EOSINOPHIL # BLD AUTO: 0.25 X10*3/UL (ref 0–0.7)
EOSINOPHIL # BLD AUTO: 0.27 X10*3/UL (ref 0–0.7)
EOSINOPHIL NFR BLD AUTO: 3.4 %
EOSINOPHIL NFR BLD AUTO: 3.7 %
ERYTHROCYTE [DISTWIDTH] IN BLOOD BY AUTOMATED COUNT: 14.6 % (ref 11.5–14.5)
ERYTHROCYTE [DISTWIDTH] IN BLOOD BY AUTOMATED COUNT: 14.7 % (ref 11.5–14.5)
GLUCOSE SERPL-MCNC: 102 MG/DL (ref 74–99)
HCT VFR BLD AUTO: 42.8 % (ref 41–52)
HCT VFR BLD AUTO: 42.8 % (ref 41–52)
HDLC SERPL-MCNC: 38.6 MG/DL
HGB BLD-MCNC: 14.1 G/DL (ref 13.5–17.5)
HGB BLD-MCNC: 14.1 G/DL (ref 13.5–17.5)
IMM GRANULOCYTES # BLD AUTO: 0.04 X10*3/UL (ref 0–0.7)
IMM GRANULOCYTES # BLD AUTO: 0.04 X10*3/UL (ref 0–0.7)
IMM GRANULOCYTES NFR BLD AUTO: 0.5 % (ref 0–0.9)
IMM GRANULOCYTES NFR BLD AUTO: 0.5 % (ref 0–0.9)
LDLC SERPL CALC-MCNC: 113 MG/DL
LYMPHOCYTES # BLD AUTO: 2.21 X10*3/UL (ref 1.2–4.8)
LYMPHOCYTES # BLD AUTO: 2.26 X10*3/UL (ref 1.2–4.8)
LYMPHOCYTES NFR BLD AUTO: 30.3 %
LYMPHOCYTES NFR BLD AUTO: 30.8 %
MCH RBC QN AUTO: 29.3 PG (ref 26–34)
MCH RBC QN AUTO: 29.4 PG (ref 26–34)
MCHC RBC AUTO-ENTMCNC: 32.9 G/DL (ref 32–36)
MCHC RBC AUTO-ENTMCNC: 32.9 G/DL (ref 32–36)
MCV RBC AUTO: 89 FL (ref 80–100)
MCV RBC AUTO: 89 FL (ref 80–100)
MONOCYTES # BLD AUTO: 0.62 X10*3/UL (ref 0.1–1)
MONOCYTES # BLD AUTO: 0.63 X10*3/UL (ref 0.1–1)
MONOCYTES NFR BLD AUTO: 8.5 %
MONOCYTES NFR BLD AUTO: 8.6 %
NEUTROPHILS # BLD AUTO: 4.09 X10*3/UL (ref 1.2–7.7)
NEUTROPHILS # BLD AUTO: 4.11 X10*3/UL (ref 1.2–7.7)
NEUTROPHILS NFR BLD AUTO: 55.7 %
NEUTROPHILS NFR BLD AUTO: 56.5 %
NON HDL CHOLESTEROL: 130 MG/DL (ref 0–149)
NRBC BLD-RTO: 0 /100 WBCS (ref 0–0)
NRBC BLD-RTO: 0 /100 WBCS (ref 0–0)
PLATELET # BLD AUTO: 242 X10*3/UL (ref 150–450)
PLATELET # BLD AUTO: 248 X10*3/UL (ref 150–450)
POTASSIUM SERPL-SCNC: 4.3 MMOL/L (ref 3.5–5.3)
PROT SERPL-MCNC: 6.6 G/DL (ref 6.4–8.2)
RBC # BLD AUTO: 4.8 X10*6/UL (ref 4.5–5.9)
RBC # BLD AUTO: 4.82 X10*6/UL (ref 4.5–5.9)
SODIUM SERPL-SCNC: 137 MMOL/L (ref 136–145)
TRIGL SERPL-MCNC: 87 MG/DL (ref 0–149)
VLDL: 17 MG/DL (ref 0–40)
WBC # BLD AUTO: 7.3 X10*3/UL (ref 4.4–11.3)
WBC # BLD AUTO: 7.3 X10*3/UL (ref 4.4–11.3)

## 2024-09-05 PROCEDURE — 80053 COMPREHEN METABOLIC PANEL: CPT

## 2024-09-05 PROCEDURE — 88185 FLOWCYTOMETRY/TC ADD-ON: CPT

## 2024-09-05 PROCEDURE — 36415 COLL VENOUS BLD VENIPUNCTURE: CPT

## 2024-09-05 PROCEDURE — 87536 HIV-1 QUANT&REVRSE TRNSCRPJ: CPT

## 2024-09-05 PROCEDURE — 88184 FLOWCYTOMETRY/ TC 1 MARKER: CPT

## 2024-09-05 PROCEDURE — 80061 LIPID PANEL: CPT

## 2024-09-05 PROCEDURE — 85025 COMPLETE CBC W/AUTO DIFF WBC: CPT

## 2024-09-06 LAB
HIV1 RNA # PLAS NAA DL=20: NOT DETECTED {COPIES}/ML
HIV1 RNA SPEC NAA+PROBE-LOG#: NORMAL {LOG_COPIES}/ML

## 2024-09-08 LAB
CD3+CD4+ CELLS # BLD: 0.31 X10E9/L
CD3+CD4+ CELLS # BLD: 309 /MM3
CD3+CD4+ CELLS NFR BLD: 14 %
CD3+CD4+ CELLS/CD3+CD8+ CLL BLD: 0.25 %
CD3+CD8+ CELLS # BLD: 1.22 X10E9/L
CD3+CD8+ CELLS NFR BLD: 55 %
LYMPHOCYTES # SPEC AUTO: 2.21 X10*3/UL

## 2024-09-16 ENCOUNTER — TELEPHONE (OUTPATIENT)
Dept: IMMUNOLOGY | Facility: CLINIC | Age: 59
End: 2024-09-16
Payer: COMMERCIAL

## 2024-09-17 ENCOUNTER — OFFICE VISIT (OUTPATIENT)
Dept: IMMUNOLOGY | Facility: CLINIC | Age: 59
End: 2024-09-17
Payer: COMMERCIAL

## 2024-09-17 VITALS
OXYGEN SATURATION: 98 % | WEIGHT: 262.2 LBS | SYSTOLIC BLOOD PRESSURE: 131 MMHG | DIASTOLIC BLOOD PRESSURE: 85 MMHG | RESPIRATION RATE: 18 BRPM | BODY MASS INDEX: 34.59 KG/M2 | HEART RATE: 65 BPM

## 2024-09-17 DIAGNOSIS — B20 AIDS (MULTI): Primary | ICD-10-CM

## 2024-09-17 PROCEDURE — 99214 OFFICE O/P EST MOD 30 MIN: CPT | Performed by: INTERNAL MEDICINE

## 2024-09-17 PROCEDURE — 1036F TOBACCO NON-USER: CPT | Performed by: INTERNAL MEDICINE

## 2024-09-17 PROCEDURE — 3075F SYST BP GE 130 - 139MM HG: CPT | Performed by: INTERNAL MEDICINE

## 2024-09-17 PROCEDURE — 3079F DIAST BP 80-89 MM HG: CPT | Performed by: INTERNAL MEDICINE

## 2024-09-17 RX ORDER — DOXYCYCLINE HYCLATE 100 MG
1 TABLET ORAL
COMMUNITY
Start: 2024-09-09

## 2024-09-17 ASSESSMENT — PAIN SCALES - GENERAL: PAINLEVEL: 0-NO PAIN

## 2024-09-17 NOTE — PROGRESS NOTES
Subjective   Severino Man is a 59 y.o. male who presents to the [facility] for follow-up of HIV infection.     Doing well  Had lipoma removed from left neck, was more infiltrative than imaging suggested he reports. Unfortunately not in EPIC  Did PT for back, feeling better  Taking meds, no issues. Had labs last month, UD with CD4 300s  Dr Galvez started him on doxy 100 daily for back cyst suppression. He noticed more when he was able to stop TMP/sulfa    Since last visit had TTE, showed no change in his aortic root dilation since 2021  Objective   Vitals:    09/17/24 0910   BP: 131/85   Pulse: 65   Resp: 18   SpO2: 98%        Current Outpatient Medications:     aspirin 325 mg tablet, Take 1 tablet (325 mg) by mouth once daily., Disp: , Rfl:     atorvastatin (Lipitor) 20 mg tablet, Take 1 tablet (20 mg) by mouth once daily., Disp: 30 tablet, Rfl: 5    darunavir (Prezista) 800 mg tablet, Take 1 tablet (800 mg) by mouth once daily with breakfast., Disp: 30 tablet, Rfl: 5    doxycycline (Vibra-Tabs) 100 mg tablet, Take 1 tablet (100 mg) by mouth early in the morning.., Disp: , Rfl:     elviteg-cob-emtri-tenof ALAFEN (Genvoya) [phone] mg tablet, Take 1 tablet by mouth once daily. With food, Disp: 30 tablet, Rfl: 5    hydroCHLOROthiazide (Microzide) 12.5 mg tablet, Take 1 tablet (12.5 mg) by mouth once daily., Disp: 30 tablet, Rfl: 5    ketoconazole (NIZOral) 2 % shampoo, Apply topically 3 (three) times a week. Apply to scalp every other day for 5 minutes and rinse, Disp: , Rfl:     lansoprazole (Prevacid) 30 mg DR capsule, Take 1 capsule (30 mg) by mouth 2 times a day., Disp: 60 capsule, Rfl: 5    lisinopril 10 mg tablet, Take 1 tablet (10 mg) by mouth once daily., Disp: 30 tablet, Rfl: 5    magnesium gluconate (Magonate) 27.5 mg magne- sium (500 mg) tablet, Take 1 tablet (27.5 mg) by mouth 2 times a day., Disp: , Rfl:     melatonin 3 mg tablet, Take 5 mg by mouth once daily at bedtime., Disp: , Rfl:     naproxen  (Naprosyn) 500 mg tablet, Take 1 tablet (500 mg) by mouth 2 times a day. With meals, Disp: 60 tablet, Rfl: 5    sertraline (Zoloft) 25 mg tablet, Take 1 tablet (25 mg) by mouth once daily., Disp: 30 tablet, Rfl: 5    sertraline (Zoloft) 50 mg tablet, Take 1 tablet (50 mg) by mouth once daily., Disp: 30 tablet, Rfl: 5    triamcinolone (Kenalog) 0.1 % cream, Apply topically twice a day., Disp: , Rfl:    Physical Exam  Physical Exam  Constitutional:       General: not in acute distress.     Appearance: Normal appearance.   HENT:      Head: Normocephalic and atraumatic.      Pharynx: Oropharynx is clear. No oropharyngeal exudate.   Eyes:      General: No scleral icterus.  Cardiovascular:      Rate and Rhythm: Normal rate and regular rhythm.   Pulmonary:      Breath sounds: Normal breath sounds. No wheezing or rhonchi.   Abdominal:      Palpations: Abdomen is soft.      Tenderness: There is no abdominal tenderness.   Musculoskeletal:      Cervical back: Neck supple.      Right lower leg: No edema.      Left lower leg: No edema.   Skin:     Findings: No rash.   Neurological:      Mental Status: alert.   Psychiatric:         Mood and Affect: Mood normal.     Laboratory  Lab Results   Component Value Date    OTQ6ETWVE NOT DETECTED 06/06/2023    [id number] 0.309 (L) 09/05/2024      Lab Results   Component Value Date    WBC 7.3 09/05/2024    WBC 7.3 09/05/2024    HGB 14.1 09/05/2024    HGB 14.1 09/05/2024    HCT 42.8 09/05/2024    HCT 42.8 09/05/2024    MCV 89 09/05/2024    MCV 89 09/05/2024     09/05/2024     09/05/2024      Lab Results   Component Value Date    GLUCOSE 102 (H) 09/05/2024    CALCIUM 9.5 09/05/2024     09/05/2024    K 4.3 09/05/2024    CO2 25 09/05/2024     09/05/2024    BUN 23 09/05/2024    CREATININE 0.92 09/05/2024      Lab Results   Component Value Date    CHOL 169 09/05/2024    CHOL 147 10/03/2023    CHOL 133 01/10/2023     Lab Results   Component Value Date    HDL 38.6  "09/05/2024    HDL 42.2 10/03/2023    HDL 37.4 (A) 01/10/2023     Lab Results   Component Value Date    LDLCALC 113 (H) 09/05/2024    LDLCALC 84 (L) 10/03/2023     Lab Results   Component Value Date    TRIG 87 09/05/2024    TRIG 105 10/03/2023    TRIG 71 01/10/2023     No components found for: \"CHOLHDL\"      Assessment/Plan   Problem List Items Addressed This Visit       AIDS (Multi) - Primary    Current Assessment & Plan     UD with Cd4 300s . On statin as well           Health Maintenance  Will get flu shot and covid booster at pharmacy-does not want to feel poorly at work so will get on a weekend  Becca Lacy MD   "

## 2024-10-29 DIAGNOSIS — I10 HYPERTENSION, UNSPECIFIED TYPE: ICD-10-CM

## 2024-10-29 DIAGNOSIS — F41.9 ANXIETY: ICD-10-CM

## 2024-10-29 RX ORDER — ATORVASTATIN CALCIUM 20 MG/1
20 TABLET, FILM COATED ORAL DAILY
Qty: 30 TABLET | Refills: 5 | Status: SHIPPED | OUTPATIENT
Start: 2024-10-29 | End: 2025-04-27

## 2024-10-29 RX ORDER — SERTRALINE HYDROCHLORIDE 25 MG/1
25 TABLET, FILM COATED ORAL DAILY
Qty: 30 TABLET | Refills: 5 | Status: SHIPPED | OUTPATIENT
Start: 2024-10-29 | End: 2025-04-27

## 2024-10-29 RX ORDER — SERTRALINE HYDROCHLORIDE 50 MG/1
50 TABLET, FILM COATED ORAL DAILY
Qty: 30 TABLET | Refills: 5 | Status: SHIPPED | OUTPATIENT
Start: 2024-10-29 | End: 2025-04-27

## 2025-01-22 DIAGNOSIS — B20 AIDS (MULTI): ICD-10-CM

## 2025-01-22 RX ORDER — DARUNAVIR 800 MG/1
800 TABLET, FILM COATED ORAL
Qty: 30 TABLET | Refills: 5 | Status: SHIPPED | OUTPATIENT
Start: 2025-01-22

## 2025-02-03 DIAGNOSIS — I10 HYPERTENSION, UNSPECIFIED TYPE: ICD-10-CM

## 2025-02-03 DIAGNOSIS — B20 AIDS (MULTI): ICD-10-CM

## 2025-02-03 DIAGNOSIS — M50.30 DISC DISEASE, DEGENERATIVE, CERVICAL: ICD-10-CM

## 2025-02-03 RX ORDER — DARUNAVIR 800 MG/1
800 TABLET, FILM COATED ORAL
Qty: 30 TABLET | Refills: 5 | Status: SHIPPED | OUTPATIENT
Start: 2025-02-03

## 2025-02-03 RX ORDER — ELVITEGRAVIR, COBICISTAT, EMTRICITABINE, AND TENOFOVIR ALAFENAMIDE 150; 150; 200; 10 MG/1; MG/1; MG/1; MG/1
1 TABLET ORAL DAILY
Qty: 30 TABLET | Refills: 5 | Status: SHIPPED | OUTPATIENT
Start: 2025-02-03

## 2025-02-03 RX ORDER — NAPROXEN 500 MG/1
500 TABLET ORAL 2 TIMES DAILY
Qty: 60 TABLET | Refills: 5 | Status: SHIPPED | OUTPATIENT
Start: 2025-02-03

## 2025-02-03 RX ORDER — HYDROCHLOROTHIAZIDE 12.5 MG/1
12.5 TABLET ORAL DAILY
Qty: 30 TABLET | Refills: 5 | Status: SHIPPED | OUTPATIENT
Start: 2025-02-03

## 2025-02-25 DIAGNOSIS — I10 HYPERTENSION, UNSPECIFIED TYPE: ICD-10-CM

## 2025-02-25 DIAGNOSIS — K21.9 GASTROESOPHAGEAL REFLUX DISEASE, UNSPECIFIED WHETHER ESOPHAGITIS PRESENT: ICD-10-CM

## 2025-02-25 RX ORDER — LISINOPRIL 10 MG/1
10 TABLET ORAL DAILY
Qty: 30 TABLET | Refills: 5 | Status: SHIPPED | OUTPATIENT
Start: 2025-02-25

## 2025-02-25 RX ORDER — LANSOPRAZOLE 30 MG/1
30 CAPSULE, DELAYED RELEASE ORAL 2 TIMES DAILY
Qty: 60 CAPSULE | Refills: 5 | Status: SHIPPED | OUTPATIENT
Start: 2025-02-25

## 2025-02-27 DIAGNOSIS — B20 AIDS (MULTI): ICD-10-CM

## 2025-03-03 DIAGNOSIS — B20 AIDS (MULTI): ICD-10-CM

## 2025-03-04 ENCOUNTER — OFFICE VISIT (OUTPATIENT)
Dept: URGENT CARE | Age: 60
End: 2025-03-04
Payer: COMMERCIAL

## 2025-03-04 VITALS
WEIGHT: 250 LBS | SYSTOLIC BLOOD PRESSURE: 149 MMHG | DIASTOLIC BLOOD PRESSURE: 82 MMHG | HEART RATE: 94 BPM | TEMPERATURE: 100 F | RESPIRATION RATE: 18 BRPM | OXYGEN SATURATION: 98 % | HEIGHT: 73 IN | BODY MASS INDEX: 33.13 KG/M2

## 2025-03-04 DIAGNOSIS — R05.9 COUGH, UNSPECIFIED TYPE: ICD-10-CM

## 2025-03-04 DIAGNOSIS — J06.9 VIRAL URI: Primary | ICD-10-CM

## 2025-03-04 LAB
POC RAPID INFLUENZA A: NEGATIVE
POC RAPID INFLUENZA B: NEGATIVE
POC RAPID STREP: NEGATIVE
POC SARS-COV-2 AG BINAX: NORMAL

## 2025-03-04 PROCEDURE — 87811 SARS-COV-2 COVID19 W/OPTIC: CPT | Performed by: FAMILY MEDICINE

## 2025-03-04 PROCEDURE — 99213 OFFICE O/P EST LOW 20 MIN: CPT | Performed by: FAMILY MEDICINE

## 2025-03-04 PROCEDURE — 87880 STREP A ASSAY W/OPTIC: CPT | Performed by: FAMILY MEDICINE

## 2025-03-04 PROCEDURE — 3077F SYST BP >= 140 MM HG: CPT | Performed by: FAMILY MEDICINE

## 2025-03-04 PROCEDURE — 1036F TOBACCO NON-USER: CPT | Performed by: FAMILY MEDICINE

## 2025-03-04 PROCEDURE — 3008F BODY MASS INDEX DOCD: CPT | Performed by: FAMILY MEDICINE

## 2025-03-04 PROCEDURE — 87804 INFLUENZA ASSAY W/OPTIC: CPT | Performed by: FAMILY MEDICINE

## 2025-03-04 PROCEDURE — 3079F DIAST BP 80-89 MM HG: CPT | Performed by: FAMILY MEDICINE

## 2025-03-04 RX ORDER — BROMPHENIRAMINE MALEATE, PSEUDOEPHEDRINE HYDROCHLORIDE, AND DEXTROMETHORPHAN HYDROBROMIDE 2; 30; 10 MG/5ML; MG/5ML; MG/5ML
10 SYRUP ORAL 4 TIMES DAILY PRN
Qty: 120 ML | Refills: 0 | Status: SHIPPED | OUTPATIENT
Start: 2025-03-04 | End: 2025-03-14

## 2025-03-04 ASSESSMENT — PAIN SCALES - GENERAL: PAINLEVEL_OUTOF10: 8

## 2025-03-04 NOTE — PROGRESS NOTES
Subjective   Patient ID: Severino Man is a 59 y.o. male. They present today with a chief complaint of URI (5 days/Cough, headache, back pain, sore throat, fatigue, body aches, diarrhea,).    History of Present Illness  HPI  Days of cough, congestion, postnasal drip runny nose. No fevers have been noted. Patient denies shortness of breath, chest pain, or wheezing. No red eyes, eye pain, or discharge. They deny nausea vomiting or diarrhea. No known exposures to strep mono influenza, COVID-19 or pneumonia. No skin rashes are noted. Over-the-counter medications are offering minimal relief from symptoms.      Past Medical History  Allergies as of 03/04/2025    (No Known Allergies)       (Not in a hospital admission)       Past Medical History:   Diagnosis Date    Acute embolism and thrombosis of unspecified deep veins of right lower extremity (Multi) 01/2016    Deep vein thrombosis (DVT) of right lower extremity    Diverticulitis of intestine, part unspecified, without perforation or abscess without bleeding 2017    Diverticulitis    Malignant neoplasm of right testis, unspecified whether descended or undescended (Multi) 1982    Primary embryonal cell carcinoma of right testis    Paresthesia of skin 09/12/2015    Paresthesia of arm    Pneumocystis carinii pneumonia (Multi) 2001    Sinusitis 12/22/2022    Sore throat 10/28/2021       Past Surgical History:   Procedure Laterality Date    ANOSCOPY      with ablation, AIN2    COLECTOMY PARTIAL / TOTAL  2011    Partial Colectomy - Sigmoid    COLONOSCOPY  2018    colon normal. 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 His smoking use included cigarettes. He has been exposed to tobacco smoke. He has never used smokeless tobacco. He reports that he does not currently use alcohol. He reports  "that he does not use drugs.    Review of Systems  Review of Systems     As in history of present illness                          Objective    Vitals:    03/04/25 0802   BP: 149/82   BP Location: Left arm   Patient Position: Sitting   BP Cuff Size: Large adult   Pulse: 94   Resp: 18   Temp: 37.8 °C (100 °F)   TempSrc: Oral   SpO2: 98%   Weight: 113 kg (250 lb)   Height: 1.854 m (6' 1\")     No LMP for male patient.    Physical Exam  Gen.-alert cooperative and in no apparent distress  Head and face- no swelling redness, tenderness or rash  Eyes-EOMI, no redness or discharge is noted  ENT- bilateral nasal congestion with clear rhinorrhea and postnasal drip in oral pharynx  Neck- normal range of motion with no lymphadenopathy or mass.   Pulmonary- no respiratory distress noted. Lungs clear to auscultation without wheezes rhonchi or rales  Skin- no rashes discoloration or skin lesions noted  Lymphatic-- no lymph node swelling or tenderness appreciated  Procedures    Point of Care Test & Imaging Results from this visit  Results for orders placed or performed in visit on 03/04/25   POCT Covid-19 Rapid Antigen   Result Value Ref Range    POC BOO-COV-2 AG  Presumptive negative test for SARS-CoV-2 (no antigen detected)     Presumptive negative test for SARS-CoV-2 (no antigen detected)   POCT Influenza A/B manually resulted   Result Value Ref Range    POC Rapid Influenza A Negative Negative    POC Rapid Influenza B Negative Negative   POCT rapid strep A manually resulted   Result Value Ref Range    POC Rapid Strep Negative Negative      No results found.    Diagnostic study results (if any) were reviewed by Geovany Posey MD.    Assessment/Plan   Allergies, medications, history, and pertinent labs/EKGs/Imaging reviewed by Geovany Posey MD.     Medical Decision Making  At time of discharge patient was clinically well-appearing and HDS for outpatient management. The patient and/or family was educated regarding diagnosis, " supportive care, OTC and Rx medications. The patient and/or family was given the opportunity to ask questions prior to discharge.  They verbalized understanding of my discussion of the plans for treatment, expected course, indications to return to  or seek further evaluation in ED, and the need for timely follow up as directed.   They were provided with a work/school excuse if requested.    Orders and Diagnoses  Diagnoses and all orders for this visit:  Viral URI  Cough, unspecified type  -     POCT Covid-19 Rapid Antigen  -     POCT Influenza A/B manually resulted  -     POCT rapid strep A manually resulted      Medical Admin Record      Patient disposition: Home    Electronically signed by Geovany Posey MD  8:40 AM

## 2025-03-07 ENCOUNTER — LAB (OUTPATIENT)
Dept: LAB | Facility: HOSPITAL | Age: 60
End: 2025-03-07
Payer: COMMERCIAL

## 2025-03-07 DIAGNOSIS — B20 HUMAN IMMUNODEFICIENCY VIRUS (HIV) DISEASE (MULTI): Primary | ICD-10-CM

## 2025-03-07 LAB
ALBUMIN SERPL BCP-MCNC: 4.4 G/DL (ref 3.4–5)
ALP SERPL-CCNC: 66 U/L (ref 33–136)
ALT SERPL W P-5'-P-CCNC: 16 U/L (ref 10–52)
ANION GAP SERPL CALC-SCNC: 13 MMOL/L (ref 10–20)
AST SERPL W P-5'-P-CCNC: 16 U/L (ref 9–39)
BASOPHILS # BLD AUTO: 0.04 X10*3/UL (ref 0–0.1)
BASOPHILS NFR BLD AUTO: 0.5 %
BILIRUB SERPL-MCNC: 0.6 MG/DL (ref 0–1.2)
BUN SERPL-MCNC: 16 MG/DL (ref 6–23)
CALCIUM SERPL-MCNC: 9.6 MG/DL (ref 8.6–10.3)
CHLORIDE SERPL-SCNC: 104 MMOL/L (ref 98–107)
CO2 SERPL-SCNC: 22 MMOL/L (ref 21–32)
CREAT SERPL-MCNC: 0.9 MG/DL (ref 0.5–1.3)
EGFRCR SERPLBLD CKD-EPI 2021: >90 ML/MIN/1.73M*2
EOSINOPHIL # BLD AUTO: 0.34 X10*3/UL (ref 0–0.7)
EOSINOPHIL NFR BLD AUTO: 4.3 %
ERYTHROCYTE [DISTWIDTH] IN BLOOD BY AUTOMATED COUNT: 13.9 % (ref 11.5–14.5)
GLUCOSE SERPL-MCNC: 106 MG/DL (ref 74–99)
HCT VFR BLD AUTO: 44.6 % (ref 41–52)
HGB BLD-MCNC: 14.7 G/DL (ref 13.5–17.5)
IMM GRANULOCYTES # BLD AUTO: 0.04 X10*3/UL (ref 0–0.7)
IMM GRANULOCYTES NFR BLD AUTO: 0.5 % (ref 0–0.9)
LYMPHOCYTES # BLD AUTO: 1.93 X10*3/UL (ref 1.2–4.8)
LYMPHOCYTES NFR BLD AUTO: 24.4 %
MCH RBC QN AUTO: 29.1 PG (ref 26–34)
MCHC RBC AUTO-ENTMCNC: 33 G/DL (ref 32–36)
MCV RBC AUTO: 88 FL (ref 80–100)
MONOCYTES # BLD AUTO: 0.97 X10*3/UL (ref 0.1–1)
MONOCYTES NFR BLD AUTO: 12.2 %
NEUTROPHILS # BLD AUTO: 4.6 X10*3/UL (ref 1.2–7.7)
NEUTROPHILS NFR BLD AUTO: 58.1 %
NRBC BLD-RTO: 0 /100 WBCS (ref 0–0)
PLATELET # BLD AUTO: 229 X10*3/UL (ref 150–450)
POTASSIUM SERPL-SCNC: 4.3 MMOL/L (ref 3.5–5.3)
PROT SERPL-MCNC: 6.7 G/DL (ref 6.4–8.2)
RBC # BLD AUTO: 5.05 X10*6/UL (ref 4.5–5.9)
SODIUM SERPL-SCNC: 135 MMOL/L (ref 136–145)
WBC # BLD AUTO: 7.9 X10*3/UL (ref 4.4–11.3)

## 2025-03-07 PROCEDURE — 88185 FLOWCYTOMETRY/TC ADD-ON: CPT

## 2025-03-07 PROCEDURE — 87536 HIV-1 QUANT&REVRSE TRNSCRPJ: CPT

## 2025-03-07 PROCEDURE — 85025 COMPLETE CBC W/AUTO DIFF WBC: CPT

## 2025-03-07 PROCEDURE — 80053 COMPREHEN METABOLIC PANEL: CPT

## 2025-03-07 PROCEDURE — 88184 FLOWCYTOMETRY/ TC 1 MARKER: CPT

## 2025-03-09 LAB
CD3+CD4+ CELLS # BLD: 0.27 X10E9/L
CD3+CD4+ CELLS # BLD: 270 /MM3
CD3+CD4+ CELLS NFR BLD: 14 %
CD3+CD4+ CELLS/CD3+CD8+ CLL BLD: 0.28 %
CD3+CD8+ CELLS # BLD: 0.96 X10E9/L
CD3+CD8+ CELLS NFR BLD: 50 %
HIV1 RNA # PLAS NAA DL=20: NOT DETECTED {COPIES}/ML
HIV1 RNA SPEC NAA+PROBE-LOG#: NORMAL {LOG_COPIES}/ML
LYMPHOCYTES # SPEC AUTO: 1.93 X10*3/UL

## 2025-03-18 ENCOUNTER — OFFICE VISIT (OUTPATIENT)
Dept: IMMUNOLOGY | Facility: CLINIC | Age: 60
End: 2025-03-18
Payer: COMMERCIAL

## 2025-03-18 VITALS
DIASTOLIC BLOOD PRESSURE: 75 MMHG | HEIGHT: 73 IN | WEIGHT: 252 LBS | RESPIRATION RATE: 16 BRPM | TEMPERATURE: 97.9 F | SYSTOLIC BLOOD PRESSURE: 121 MMHG | OXYGEN SATURATION: 96 % | HEART RATE: 72 BPM | BODY MASS INDEX: 33.4 KG/M2

## 2025-03-18 DIAGNOSIS — E78.00 HYPERCHOLESTEROLEMIA: ICD-10-CM

## 2025-03-18 DIAGNOSIS — B20 AIDS (MULTI): Primary | ICD-10-CM

## 2025-03-18 DIAGNOSIS — K62.82 ANAL DYSPLASIA: ICD-10-CM

## 2025-03-18 DIAGNOSIS — I10 HYPERTENSION, UNSPECIFIED TYPE: ICD-10-CM

## 2025-03-18 PROBLEM — D17.1 LIPOMA OF ABDOMINAL WALL: Status: RESOLVED | Noted: 2025-03-18 | Resolved: 2025-03-18

## 2025-03-18 PROCEDURE — 3074F SYST BP LT 130 MM HG: CPT | Performed by: INTERNAL MEDICINE

## 2025-03-18 PROCEDURE — 99214 OFFICE O/P EST MOD 30 MIN: CPT | Performed by: INTERNAL MEDICINE

## 2025-03-18 PROCEDURE — 3078F DIAST BP <80 MM HG: CPT | Performed by: INTERNAL MEDICINE

## 2025-03-18 PROCEDURE — 1036F TOBACCO NON-USER: CPT | Performed by: INTERNAL MEDICINE

## 2025-03-18 PROCEDURE — 3008F BODY MASS INDEX DOCD: CPT | Performed by: INTERNAL MEDICINE

## 2025-03-18 RX ORDER — TRETINOIN 0.5 MG/G
CREAM TOPICAL
COMMUNITY
Start: 2025-01-22

## 2025-03-18 ASSESSMENT — PAIN SCALES - GENERAL: PAINLEVEL_OUTOF10: 0-NO PAIN

## 2025-03-18 NOTE — PROGRESS NOTES
Subjective   Severino Man is a 59 y.o. male who presents to the [facility] for follow-up of HIV infection.   Doing well. Lost some weight- watching diet and trying to walk more  Just got over a URI-thinks caught flying back from [place].  Since lost weight notices skin pouching over his old RUQ incision-looks like laxity from prior muscle resection in that area    Objective   Vitals:    03/18/25 0904   BP: 121/75   Pulse: 72   Resp: 16   Temp: 36.6 °C (97.9 °F)   SpO2: 96%        Current Outpatient Medications:     aspirin 325 mg tablet, Take 1 tablet (325 mg) by mouth once daily., Disp: , Rfl:     atorvastatin (Lipitor) 20 mg tablet, Take 1 tablet (20 mg) by mouth once daily., Disp: 30 tablet, Rfl: 5    darunavir (Prezista) 800 mg tablet, Take 1 tablet (800 mg) by mouth once daily with breakfast., Disp: 30 tablet, Rfl: 5    doxycycline (Vibra-Tabs) 100 mg tablet, Take 1 tablet (100 mg) by mouth early in the morning.., Disp: , Rfl:     elviteg-cob-emtri-tenof ALAFEN (Genvoya) [phone] mg tablet, Take 1 tablet by mouth once daily. With food, Disp: 30 tablet, Rfl: 5    hydroCHLOROthiazide (Microzide) 12.5 mg tablet, Take 1 tablet (12.5 mg) by mouth once daily., Disp: 30 tablet, Rfl: 5    ketoconazole (NIZOral) 2 % shampoo, Apply topically 3 (three) times a week. Apply to scalp every other day for 5 minutes and rinse, Disp: , Rfl:     lansoprazole (Prevacid) 30 mg DR capsule, Take 1 capsule (30 mg) by mouth 2 times a day., Disp: 60 capsule, Rfl: 5    lisinopril 10 mg tablet, Take 1 tablet (10 mg) by mouth once daily., Disp: 30 tablet, Rfl: 5    magnesium gluconate (Magonate) 27.5 mg magne- sium (500 mg) tablet, Take 1 tablet (27.5 mg) by mouth 2 times a day., Disp: , Rfl:     melatonin 3 mg tablet, Take 5 mg by mouth once daily at bedtime., Disp: , Rfl:     naproxen (Naprosyn) 500 mg tablet, Take 1 tablet (500 mg) by mouth 2 times a day. With meals, Disp: 60 tablet, Rfl: 5    sertraline (Zoloft) 25 mg tablet, Take 1  tablet (25 mg) by mouth once daily., Disp: 30 tablet, Rfl: 5    sertraline (Zoloft) 50 mg tablet, Take 1 tablet (50 mg) by mouth once daily., Disp: 30 tablet, Rfl: 5    tretinoin (Retin-A) 0.05 % cream, apply pea size amount to face at bedtime, Disp: , Rfl:     triamcinolone (Kenalog) 0.1 % cream, Apply topically twice a day., Disp: , Rfl:    Physical Exam  Physical Exam  Constitutional:       General: not in acute distress.     Appearance: Normal appearance.   HENT:      Head: Normocephalic and atraumatic.      Pharynx: Oropharynx is clear. No oropharyngeal exudate.   Eyes:      General: No scleral icterus.  Cardiovascular:      Rate and Rhythm: Normal rate and regular rhythm.   Pulmonary:      Breath sounds: Normal breath sounds. No wheezing or rhonchi.   Abdominal:      Palpations: Abdomen is soft. Well healed RUQ scar with soft, nontender skin pouching over top of the incision     Tenderness: There is no abdominal tenderness.   Musculoskeletal:      Cervical back: Neck supple.      Right lower leg: No edema.      Left lower leg: No edema.   Skin:     Findings: No rash.   Neurological:      Mental Status: alert.   Psychiatric:         Mood and Affect: Mood normal.     Laboratory  Lab Results   Component Value Date    OMP4LTCKS NOT DETECTED 06/06/2023    [id number] 0.270 (L) 03/07/2025      Lab Results   Component Value Date    WBC 7.9 03/07/2025    HGB 14.7 03/07/2025    HCT 44.6 03/07/2025    MCV 88 03/07/2025     03/07/2025      Lab Results   Component Value Date    GLUCOSE 106 (H) 03/07/2025    CALCIUM 9.6 03/07/2025     (L) 03/07/2025    K 4.3 03/07/2025    CO2 22 03/07/2025     03/07/2025    BUN 16 03/07/2025    CREATININE 0.90 03/07/2025      Lab Results   Component Value Date    CHOL 169 09/05/2024    CHOL 147 10/03/2023    CHOL 133 01/10/2023     Lab Results   Component Value Date    HDL 38.6 09/05/2024    HDL 42.2 10/03/2023    HDL 37.4 (A) 01/10/2023     Lab Results   Component Value  "Date    LDLCALC 113 (H) 09/05/2024    LDLCALC 84 (L) 10/03/2023     Lab Results   Component Value Date    TRIG 87 09/05/2024    TRIG 105 10/03/2023    TRIG 71 01/10/2023     No components found for: \"CHOLHDL\"      Assessment/Plan   Problem List Items Addressed This Visit       AIDS (Multi) - Primary    Overview     Prior K103N, 100I,188Y, K65R. Suppressed on DRV/RTV, RTG, TDF/FTC 2009-16, then EVG/C/TAF/FTC + DRV since         Current Assessment & Plan     Got labs 2 weeks ago CD4 270 and UD. Continue Genvoya/DRV         Anal dysplasia    Current Assessment & Plan     [facility]         Relevant Orders    Referral to Colorectal Surgery    Hypertension    Current Assessment & Plan     Good on lisinopril/hctz         Hypercholesterolemia    Current Assessment & Plan     On atorvastatin           Health Maintenance  Got flu shot in the fall  Up to date with dental, goes every 6 months  Not sexually active, no sti screen  Becca Lacy MD   "

## 2025-04-10 DIAGNOSIS — I10 HYPERTENSION, UNSPECIFIED TYPE: ICD-10-CM

## 2025-04-10 DIAGNOSIS — F41.9 ANXIETY: ICD-10-CM

## 2025-04-10 RX ORDER — SERTRALINE HYDROCHLORIDE 25 MG/1
25 TABLET, FILM COATED ORAL DAILY
Qty: 30 TABLET | Refills: 5 | Status: SHIPPED | OUTPATIENT
Start: 2025-04-10

## 2025-04-10 RX ORDER — ATORVASTATIN CALCIUM 20 MG/1
20 TABLET, FILM COATED ORAL DAILY
Qty: 30 TABLET | Refills: 5 | Status: SHIPPED | OUTPATIENT
Start: 2025-04-10

## 2025-04-10 RX ORDER — SERTRALINE HYDROCHLORIDE 50 MG/1
50 TABLET, FILM COATED ORAL DAILY
Qty: 30 TABLET | Refills: 5 | Status: SHIPPED | OUTPATIENT
Start: 2025-04-10

## 2025-05-12 ENCOUNTER — APPOINTMENT (OUTPATIENT)
Dept: SURGERY | Facility: CLINIC | Age: 60
End: 2025-05-12
Payer: COMMERCIAL

## 2025-05-12 DIAGNOSIS — B20 AIDS (MULTI): ICD-10-CM

## 2025-05-12 RX ORDER — DARUNAVIR 800 MG/1
TABLET, FILM COATED ORAL
Qty: 30 TABLET | Refills: 5 | Status: SHIPPED | OUTPATIENT
Start: 2025-05-12

## 2025-05-15 ENCOUNTER — APPOINTMENT (OUTPATIENT)
Dept: SURGERY | Facility: CLINIC | Age: 60
End: 2025-05-15
Payer: COMMERCIAL

## 2025-05-29 ENCOUNTER — APPOINTMENT (OUTPATIENT)
Dept: SURGERY | Facility: CLINIC | Age: 60
End: 2025-05-29
Payer: COMMERCIAL

## 2025-06-03 ENCOUNTER — APPOINTMENT (OUTPATIENT)
Facility: CLINIC | Age: 60
End: 2025-06-03
Payer: COMMERCIAL

## 2025-06-19 ENCOUNTER — OFFICE VISIT (OUTPATIENT)
Dept: URGENT CARE | Age: 60
End: 2025-06-19
Payer: COMMERCIAL

## 2025-06-19 ENCOUNTER — APPOINTMENT (OUTPATIENT)
Dept: CARDIOLOGY | Facility: HOSPITAL | Age: 60
End: 2025-06-19
Payer: COMMERCIAL

## 2025-06-19 ENCOUNTER — HOSPITAL ENCOUNTER (EMERGENCY)
Facility: HOSPITAL | Age: 60
Discharge: HOME | End: 2025-06-19
Payer: COMMERCIAL

## 2025-06-19 VITALS
WEIGHT: 260 LBS | TEMPERATURE: 97.5 F | HEART RATE: 57 BPM | SYSTOLIC BLOOD PRESSURE: 119 MMHG | RESPIRATION RATE: 18 BRPM | DIASTOLIC BLOOD PRESSURE: 66 MMHG | OXYGEN SATURATION: 98 % | HEIGHT: 73 IN | BODY MASS INDEX: 34.46 KG/M2

## 2025-06-19 VITALS
WEIGHT: 260 LBS | SYSTOLIC BLOOD PRESSURE: 124 MMHG | OXYGEN SATURATION: 96 % | DIASTOLIC BLOOD PRESSURE: 67 MMHG | BODY MASS INDEX: 34.46 KG/M2 | RESPIRATION RATE: 16 BRPM | HEART RATE: 71 BPM | TEMPERATURE: 97.9 F | HEIGHT: 73 IN

## 2025-06-19 DIAGNOSIS — Z86.718 HISTORY OF DVT IN ADULTHOOD: ICD-10-CM

## 2025-06-19 DIAGNOSIS — M79.605 PAIN OF LEFT LOWER EXTREMITY: Primary | ICD-10-CM

## 2025-06-19 DIAGNOSIS — M79.89 OTHER SPECIFIED SOFT TISSUE DISORDERS: ICD-10-CM

## 2025-06-19 DIAGNOSIS — M79.662 PAIN OF LEFT CALF: Primary | ICD-10-CM

## 2025-06-19 PROCEDURE — 99284 EMERGENCY DEPT VISIT MOD MDM: CPT | Mod: 25

## 2025-06-19 PROCEDURE — 3008F BODY MASS INDEX DOCD: CPT | Performed by: STUDENT IN AN ORGANIZED HEALTH CARE EDUCATION/TRAINING PROGRAM

## 2025-06-19 PROCEDURE — 3078F DIAST BP <80 MM HG: CPT | Performed by: STUDENT IN AN ORGANIZED HEALTH CARE EDUCATION/TRAINING PROGRAM

## 2025-06-19 PROCEDURE — 93971 EXTREMITY STUDY: CPT | Performed by: SURGERY

## 2025-06-19 PROCEDURE — 3074F SYST BP LT 130 MM HG: CPT | Performed by: STUDENT IN AN ORGANIZED HEALTH CARE EDUCATION/TRAINING PROGRAM

## 2025-06-19 PROCEDURE — 99213 OFFICE O/P EST LOW 20 MIN: CPT | Performed by: STUDENT IN AN ORGANIZED HEALTH CARE EDUCATION/TRAINING PROGRAM

## 2025-06-19 PROCEDURE — 93971 EXTREMITY STUDY: CPT

## 2025-06-19 ASSESSMENT — PAIN SCALES - GENERAL
PAINLEVEL_OUTOF10: 6
PAINLEVEL_OUTOF10: 6

## 2025-06-19 ASSESSMENT — PAIN - FUNCTIONAL ASSESSMENT: PAIN_FUNCTIONAL_ASSESSMENT: 0-10

## 2025-06-19 NOTE — DISCHARGE INSTRUCTIONS
your ultrasound is negative for blood clot today, if you experience any worsening pain, redness, warmth, swelling, chest pain or shortness of breath, coughing up blood etc. return to the emergency department.    Alternate heat with ice and ibuprofen/tylenol for pain

## 2025-06-19 NOTE — H&P (VIEW-ONLY)
"Subjective   Patient ID: Severino Man is a 60 y.o. male. They present today with a chief complaint of Injury (Sprained ankle or potentially blood clot.  14 days. - Entered by patient has history of blood clots. Left ankle. Pt states calf feels tight).    History of Present Illness  Severino is a 60-year-old male with history of HIV positive status history of recurrent blood clots including venous portal vein thrombosis with patient report of provoked and unprovoked blood clots with last being around 2015.  Patient states \"I checked online and you offer this service however if I had known I would have gone to the emergency room in the first place\".  Patient denies being on anticoagulation therapy other than aspirin due to multiple medication interactions.  No other associated symptoms otherwise reported.  Patient is primarily seeking evaluation of left calf pain with associated tightness and rule out of possible blood clot versus strain.    Past Medical History  Allergies as of 06/19/2025    (No Known Allergies)       Prescriptions Prior to Admission[1]     Medical History[2]    Surgical History[3]     reports that he quit smoking about 21 years ago. His smoking use included cigarettes. He has been exposed to tobacco smoke. He has never used smokeless tobacco. He reports that he does not currently use alcohol. He reports that he does not use drugs.    Review of Systems  A 10-point review of systems was performed, otherwise unremarkable unless stated in the history of present illness.                Objective    Vitals:    06/19/25 0803   BP: 124/67   Pulse: 71   Resp: 16   Temp: 36.6 °C (97.9 °F)   TempSrc: Oral   SpO2: 96%   Weight: 118 kg (260 lb)   Height: 1.854 m (6' 1\")     No LMP for male patient.    Gen: Vitals noted and reviewed, no evidence of acute distress, well developed and afebrile.   Psych: Mood and affect appropriate for setting.  Head/Face: Atraumatic and normocephalic.   Neuro: No focal deficits " noted.  Cardiac: Regular rate and rhythm no murmur.   Lungs: Clear to auscultation throughout, No evidence of wheezing, rhonchi or crackles. Good aeration throughout.   Extremities: Symmetrical, No peripheral edema. Equivocal Homans' sign on the left  Skin: Without evidence of ecchymosis, wounds, or rashes.      Point of Care Test & Imaging Results from this visit  No results found for this visit on 06/19/25.   Imaging  No results found.    Cardiology, Vascular, and Other Imaging  No other imaging results found for the past 2 days      Diagnostic study results (if any) were reviewed by Julia Henry DO.    Assessment/Plan   Allergies, medications, history, and pertinent labs/EKGs/Imaging reviewed by Julia Henry DO.     Medical Decision Making  Discussed with the patient symptoms and clinical presentation findings of left calf pain in the setting of prior history of recurrent venous thrombosis may be secondary to this or other etiologies unspecified that cannot be definitively confirmed or ruled out in the urgent care setting.  We advised the patient should reach out to their primary care provider regarding the symptoms in the meantime go to the nearest emergency department for evaluation of DVT rule out.  Patient was agreeable to this plan and opted to go via private vehicle without EMS transport.  Patient departed the urgent care in stable condition without acute events however did leave the lobby prior to taking AVS or signing AMA, no acute events occurred however and otherwise departed in stable condition.    We discussed with the patient our clinical thoughts at this time given the above findings and clinical assessment and we had a shared decision-making conversation in a patient-centered decision-making model on how to proceed forward. The patient was instructed on the importance of a close evaluation in the ED for DVT r/o and likely STAT venous duplex US.     Follow-up with Primary Care Provider and other  care providers. The patient was also advised that an Urgent care diagnosis is often a preliminary impression and that definitive care is often not able to be given completley in the Urgent care setting.      Orders and Diagnoses  Diagnoses and all orders for this visit:  Pain of left calf  History of DVT in adulthood      Medical Admin Record      Patient disposition: ED    Electronically signed by Julia Henry DO  8:16 AM           [1] (Not in a hospital admission)   [2]   Past Medical History:  Diagnosis Date    Acute embolism and thrombosis of unspecified deep veins of right lower extremity (Multi) 01/2016    Deep vein thrombosis (DVT) of right lower extremity    Diverticulitis of intestine, part unspecified, without perforation or abscess without bleeding 2017    Diverticulitis    Lipoma of abdominal wall 03/18/2025    Malignant neoplasm of right testis, unspecified whether descended or undescended (Multi) 1982    Primary embryonal cell carcinoma of right testis    Paresthesia of skin 09/12/2015    Paresthesia of arm    Pneumocystis carinii pneumonia (Multi) 2001    Sinusitis 12/22/2022    Sore throat 10/28/2021   [3]   Past Surgical History:  Procedure Laterality Date    ANOSCOPY      with ablation, AIN2    COLECTOMY PARTIAL / TOTAL  2011    Partial Colectomy - Sigmoid    COLONOSCOPY  2018    colon normal. 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

## 2025-06-19 NOTE — ED PROVIDER NOTES
Emergency Department Provider Note        History of Present Illness     History provided by: Patient  Limitations to History: None  External Records Reviewed with Brief Summary: None    HPI:  Severino Man is a 60 y.o. male who presents today for evaluation of left leg pain that has had for the last 2 weeks, patient is unsure whether he may have pulled a muscle or something happened, states he initially noticed it in his ankle but it has been gradually creeping upwards and is now more in the calf.  Denies seeing any warmth redness or swelling.  Patient was specifically worried about a blood clot, states he had a blood clot back in 2011 in his portal vein secondary to a surgery, states that back in 2015 he had a second blood clot that was related to being hospitalized and immobilized.  Patient states he was on Lovenox because DOACs interacted with some of his HIV medications.  He states that he was subsequently taken off of blood thinners, he denies any chest pain, shortness of breath, hemoptysis, denies recent surgeries hospitalizations or travel, history of malignancy, hormone use.  He was sent by urgent care to rule out DVT.  Denies any bony injury or bony pain.    Physical Exam   Triage vitals:  T 36.4 °C (97.5 °F)  HR 60  BP (!) 142/93  RR 18  O2 98 % None (Room air)    Physical Exam  Vitals and nursing note reviewed.   Constitutional:       General: He is not in acute distress.     Appearance: Normal appearance. He is not toxic-appearing.   HENT:      Head: Normocephalic and atraumatic.      Nose: Nose normal.   Eyes:      Extraocular Movements: Extraocular movements intact.   Cardiovascular:      Rate and Rhythm: Normal rate and regular rhythm.   Pulmonary:      Effort: Pulmonary effort is normal.   Abdominal:      Palpations: Abdomen is soft.   Musculoskeletal:         General: Normal range of motion.      Cervical back: Normal range of motion and neck supple.      Comments: Bilateral lower  extremities appear similar, 2+ PT DP pulses distally, full range of motion at the knee and ankle, no warmth or erythema, positive Homans' sign left side.   Skin:     General: Skin is warm and dry.   Neurological:      General: No focal deficit present.      Mental Status: He is alert.   Psychiatric:         Mood and Affect: Mood normal.         Thought Content: Thought content normal.        Lower extremity venous duplex left           Labs Reviewed - No data to display  Diagnoses as of 25 1408   Pain of left lower extremity       Medical Decision Making & ED Course   Medical Decision Makin y.o. male with history of DVT presents today for evaluation of 2 weeks of calf pain, was here to rule out DVT, ultrasound was negative, will be discharged in stable condition, suspect muscle strain, have no concerns for cellulitis, he has no bony tenderness whatsoever, no concerns for needing an x-ray.  Will be discharged in stable condition, he is encouraged to return if any new or worsening symptoms.  Suspect a muscle strain.  ----      Differential diagnoses considered include but are not limited to: Muscle strain, spasm, DVT, ischemic limb, fracture, etc.     Social Determinants of Health which Significantly Impact Care: None identified     EKG Independent Interpretation:     Independent Result Review and Interpretation: Relevant laboratory and radiographic results were reviewed and independently interpreted by myself.  As necessary, they are commented on in the ED Course.    Chronic conditions affecting the patient's care: As documented above in [facility]    The patient was discussed with the following consultants/services: None    Care Considerations: As documented above in [facility]    ED Course:  Diagnoses as of 25 1408   Pain of left lower extremity     Disposition   As a result of the work-up, the patient was discharged home.  he was informed of his diagnosis and instructed to come back with any concerns or  worsening of condition.  he and was agreeable to the plan as discussed above.  he was given the opportunity to ask questions.  All of the patient's questions were answered.    Procedures   Procedures    Patient was seen independently    Lucy Flynn PA-C  Emergency Medicine       Lucy Flynn PA-C  06/19/25 [id number]

## 2025-06-19 NOTE — PATIENT INSTRUCTIONS
We discussed with the patient our clinical thoughts at this time given the above findings and clinical assessment and we had a shared decision-making conversation in a patient-centered decision-making model on how to proceed forward. The patient was instructed on the importance of a close evaluation in the ED for DVT r/o and likely STAT venous duplex US.     Follow-up with Primary Care Provider and other care providers. The patient was also advised that an Urgent care diagnosis is often a preliminary impression and that definitive care is often not able to be given completley in the Urgent care setting.

## 2025-07-03 ENCOUNTER — OFFICE VISIT (OUTPATIENT)
Dept: SURGERY | Facility: CLINIC | Age: 60
End: 2025-07-03
Payer: COMMERCIAL

## 2025-07-03 VITALS
HEART RATE: 80 BPM | SYSTOLIC BLOOD PRESSURE: 109 MMHG | DIASTOLIC BLOOD PRESSURE: 72 MMHG | TEMPERATURE: 98 F | BODY MASS INDEX: 35.52 KG/M2 | HEIGHT: 73 IN | WEIGHT: 268 LBS

## 2025-07-03 DIAGNOSIS — B97.7 HPV IN MALE: ICD-10-CM

## 2025-07-03 DIAGNOSIS — Z12.11 ENCOUNTER FOR SCREENING COLONOSCOPY: ICD-10-CM

## 2025-07-03 DIAGNOSIS — K62.82 AIN GRADE I: Primary | ICD-10-CM

## 2025-07-03 PROCEDURE — 99213 OFFICE O/P EST LOW 20 MIN: CPT | Mod: 25 | Performed by: NURSE PRACTITIONER

## 2025-07-03 PROCEDURE — 46600 DIAGNOSTIC ANOSCOPY SPX: CPT | Performed by: NURSE PRACTITIONER

## 2025-07-03 NOTE — PROGRESS NOTES
History Of Present Illness  Severino Man is a 60 y.o. 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     Colonoscopy 2018 negative.    He has noticed that the anus may have a few new lumps.   No c/o any itching or burning to the anus.  No c/o any issues with his BM's.  No c/o any rectal bleeding.  No c/o any accidents or leakage of stool.      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    Surgical History  He has a past surgical history that includes Colectomy partial / total (2011); Orchiectomy (Right, 1982); Lymphadenectomy (1982); Colonoscopy (2018); and Anoscopy.     Social History  He reports that he quit smoking about 21 years ago. His smoking use included cigarettes. He has been exposed to tobacco smoke. He has never used smokeless tobacco. He reports that he does not currently use alcohol. He reports that he does not use drugs.    Family History  Family History[1]     Allergies  Patient has no known allergies.    Review of Systems   All other systems reviewed and are negative.       Physical Exam  Exam conducted with a chaperone present.   Constitutional:       Appearance: Normal appearance.   HENT:      Head: Normocephalic and atraumatic.   Pulmonary:      Effort: Pulmonary effort is normal.   Musculoskeletal:         General:  Normal range of motion.   Skin:     General: Skin is warm and dry.   Neurological:      General: No focal deficit present.      Mental Status: He is alert and oriented to person, place, and time.   Psychiatric:         Mood and Affect: Mood normal.         Behavior: Behavior normal.         Anoscopy    Date/Time: 7/3/2025 11:20 AM    Performed by: JOSUE Willoughby  Authorized by: JOSUE Willoughby    Consent:     Consent obtained:  Verbal    Consent given by:  Patient    Risks, benefits, and alternatives were discussed: yes    Universal protocol:     Procedure explained and questions answered to patient or proxy's satisfaction: yes      Patient identity confirmed:  Verbally with patient  Post-procedure details:     Procedure completion:  Tolerated  Comments:      I performed an anal pap.  It looks like he has a few flat lesions in the left anterior lateral and posterior midline positions.  You can feel the roughness to the lesions in the posterior midline on LIZZIE.  Lax anus and weak tone.  On anoscopy, looking in 360 degrees, he has no lesions seen.    Assessment/Plan   Severino tolerated the anal pap today.  He will schedule to have a repeat colonoscopy in the near future.  I will have him see Dr. Gtz in the near future for further evaluation of the anal lesions.  I will call him with results of the pap test when done.       JOSUE Willoughby       [1]   Family History  Problem Relation Name Age of Onset    Diabetes Mother      Hypertension Mother      Myasthenia gravis Mother      Other (Other) Sister          sudden cardiac death

## 2025-07-07 ENCOUNTER — TELEPHONE (OUTPATIENT)
Dept: SURGERY | Facility: HOSPITAL | Age: 60
End: 2025-07-07

## 2025-07-07 DIAGNOSIS — Z12.11 COLON CANCER SCREENING: ICD-10-CM

## 2025-07-08 LAB
LABORATORY COMMENT REPORT: NORMAL
PATH REPORT.FINAL DX SPEC: NORMAL
PATH REPORT.GROSS SPEC: NORMAL
PATH REPORT.RELEVANT HX SPEC: NORMAL
PATH REPORT.TOTAL CANCER: NORMAL
RESIDENT REVIEW: NORMAL

## 2025-07-14 ENCOUNTER — TELEPHONE (OUTPATIENT)
Dept: SURGERY | Facility: CLINIC | Age: 60
End: 2025-07-14
Payer: COMMERCIAL

## 2025-07-14 NOTE — TELEPHONE ENCOUNTER
We talked about the results and he will schedule to see Dr. Gtz for further evaluation and probable HRA

## 2025-07-15 RX ORDER — POLYETHYLENE GLYCOL 3350, SODIUM SULFATE ANHYDROUS, SODIUM BICARBONATE, SODIUM CHLORIDE, POTASSIUM CHLORIDE 236; 22.74; 6.74; 5.86; 2.97 G/4L; G/4L; G/4L; G/4L; G/4L
4000 POWDER, FOR SOLUTION ORAL ONCE
Qty: 4000 ML | Refills: 0 | Status: SHIPPED | OUTPATIENT
Start: 2025-07-15 | End: 2025-07-15

## 2025-07-16 ENCOUNTER — ANESTHESIA EVENT (OUTPATIENT)
Dept: GASTROENTEROLOGY | Facility: HOSPITAL | Age: 60
End: 2025-07-16
Payer: COMMERCIAL

## 2025-07-16 ENCOUNTER — HOSPITAL ENCOUNTER (OUTPATIENT)
Dept: GASTROENTEROLOGY | Facility: HOSPITAL | Age: 60
Discharge: HOME | End: 2025-07-16
Payer: COMMERCIAL

## 2025-07-16 ENCOUNTER — ANESTHESIA (OUTPATIENT)
Dept: GASTROENTEROLOGY | Facility: HOSPITAL | Age: 60
End: 2025-07-16
Payer: COMMERCIAL

## 2025-07-16 VITALS
HEART RATE: 78 BPM | TEMPERATURE: 98.2 F | DIASTOLIC BLOOD PRESSURE: 63 MMHG | WEIGHT: 260 LBS | OXYGEN SATURATION: 97 % | HEIGHT: 73 IN | BODY MASS INDEX: 34.46 KG/M2 | SYSTOLIC BLOOD PRESSURE: 128 MMHG | RESPIRATION RATE: 16 BRPM

## 2025-07-16 DIAGNOSIS — Z12.11 ENCOUNTER FOR SCREENING COLONOSCOPY: Primary | ICD-10-CM

## 2025-07-16 PROCEDURE — 3700000001 HC GENERAL ANESTHESIA TIME - INITIAL BASE CHARGE

## 2025-07-16 PROCEDURE — 7100000009 HC PHASE TWO TIME - INITIAL BASE CHARGE

## 2025-07-16 PROCEDURE — 2500000004 HC RX 250 GENERAL PHARMACY W/ HCPCS (ALT 636 FOR OP/ED): Performed by: NURSE ANESTHETIST, CERTIFIED REGISTERED

## 2025-07-16 PROCEDURE — 45378 DIAGNOSTIC COLONOSCOPY: CPT | Performed by: INTERNAL MEDICINE

## 2025-07-16 PROCEDURE — 7100000010 HC PHASE TWO TIME - EACH INCREMENTAL 1 MINUTE

## 2025-07-16 PROCEDURE — 3700000002 HC GENERAL ANESTHESIA TIME - EACH INCREMENTAL 1 MINUTE

## 2025-07-16 PROCEDURE — 2500000001 HC RX 250 WO HCPCS SELF ADMINISTERED DRUGS (ALT 637 FOR MEDICARE OP): Performed by: INTERNAL MEDICINE

## 2025-07-16 RX ORDER — PROPOFOL 10 MG/ML
INJECTION, EMULSION INTRAVENOUS CONTINUOUS PRN
Status: DISCONTINUED | OUTPATIENT
Start: 2025-07-16 | End: 2025-07-16

## 2025-07-16 RX ORDER — SODIUM CHLORIDE, SODIUM LACTATE, POTASSIUM CHLORIDE, CALCIUM CHLORIDE 600; 310; 30; 20 MG/100ML; MG/100ML; MG/100ML; MG/100ML
INJECTION, SOLUTION INTRAVENOUS CONTINUOUS PRN
Status: DISCONTINUED | OUTPATIENT
Start: 2025-07-16 | End: 2025-07-16

## 2025-07-16 RX ORDER — LIDOCAINE HYDROCHLORIDE 20 MG/ML
INJECTION, SOLUTION EPIDURAL; INFILTRATION; INTRACAUDAL; PERINEURAL AS NEEDED
Status: DISCONTINUED | OUTPATIENT
Start: 2025-07-16 | End: 2025-07-16

## 2025-07-16 RX ORDER — DEXTROMETHORPHAN/PSEUDOEPHED 2.5-7.5/.8
DROPS ORAL AS NEEDED
Status: COMPLETED | OUTPATIENT
Start: 2025-07-16 | End: 2025-07-16

## 2025-07-16 RX ADMIN — LIDOCAINE HYDROCHLORIDE 60 MG: 20 INJECTION, SOLUTION EPIDURAL; INFILTRATION; INTRACAUDAL; PERINEURAL at 12:30

## 2025-07-16 RX ADMIN — SODIUM CHLORIDE, POTASSIUM CHLORIDE, SODIUM LACTATE AND CALCIUM CHLORIDE: 600; 310; 30; 20 INJECTION, SOLUTION INTRAVENOUS at 12:22

## 2025-07-16 RX ADMIN — PROPOFOL 50 MG: 10 INJECTION, EMULSION INTRAVENOUS at 12:31

## 2025-07-16 RX ADMIN — SIMETHICONE 333 MG: 20 EMULSION ORAL at 12:34

## 2025-07-16 RX ADMIN — PROPOFOL 40 MG: 10 INJECTION, EMULSION INTRAVENOUS at 12:41

## 2025-07-16 RX ADMIN — PROPOFOL 30 MG: 10 INJECTION, EMULSION INTRAVENOUS at 12:32

## 2025-07-16 RX ADMIN — PROPOFOL 150 MCG/KG/MIN: 10 INJECTION, EMULSION INTRAVENOUS at 12:30

## 2025-07-16 RX ADMIN — LIDOCAINE HYDROCHLORIDE 40 MG: 20 INJECTION, SOLUTION EPIDURAL; INFILTRATION; INTRACAUDAL; PERINEURAL at 12:32

## 2025-07-16 SDOH — HEALTH STABILITY: MENTAL HEALTH: CURRENT SMOKER: 0

## 2025-07-16 ASSESSMENT — PAIN SCALES - GENERAL
PAINLEVEL_OUTOF10: 0 - NO PAIN
PAINLEVEL_OUTOF10: 6
PAINLEVEL_OUTOF10: 0 - NO PAIN
PAINLEVEL_OUTOF10: 0 - NO PAIN

## 2025-07-16 ASSESSMENT — PAIN - FUNCTIONAL ASSESSMENT
PAIN_FUNCTIONAL_ASSESSMENT: 0-10

## 2025-07-16 ASSESSMENT — COLUMBIA-SUICIDE SEVERITY RATING SCALE - C-SSRS
1. IN THE PAST MONTH, HAVE YOU WISHED YOU WERE DEAD OR WISHED YOU COULD GO TO SLEEP AND NOT WAKE UP?: NO
2. HAVE YOU ACTUALLY HAD ANY THOUGHTS OF KILLING YOURSELF?: NO
6. HAVE YOU EVER DONE ANYTHING, STARTED TO DO ANYTHING, OR PREPARED TO DO ANYTHING TO END YOUR LIFE?: NO

## 2025-07-16 NOTE — ANESTHESIA POSTPROCEDURE EVALUATION
Patient: Severino Man    Procedure Summary       Date: 07/16/25 Room / Location: [facility]    Anesthesia Start: 1222 Anesthesia Stop: 1250    Procedure: COLONOSCOPY Diagnosis:       Encounter for screening colonoscopy      Encounter for screening colonoscopy    Scheduled Providers: Marleen Callejas MD Responsible Provider: Alyssa Brown MD    Anesthesia Type: MAC ASA Status: 2            Anesthesia Type: MAC    Vitals Value Taken Time   /74 07/16/25 12:51   Temp 36.8 07/16/25 12:51   Pulse 85 07/16/25 12:51   Resp 16 07/16/25 12:51   SpO2 95 07/16/25 12:51       Anesthesia Post Evaluation    Patient location during evaluation: PACU  Patient participation: complete - patient participated  Level of consciousness: awake and alert  Pain management: adequate  Airway patency: patent  Cardiovascular status: acceptable  Respiratory status: acceptable and room air  Hydration status: acceptable  Postoperative Nausea and Vomiting: none        There were no known notable events for this encounter.

## 2025-07-16 NOTE — ANESTHESIA PREPROCEDURE EVALUATION
Patient: Severino Man    Procedure Information       Date/Time: 07/16/25 1130    Scheduled providers: Marleen Callejas MD    Procedure: COLONOSCOPY    Location: [facility] [place]            Relevant Problems   Cardiac   (+) Hypercholesterolemia   (+) Hypertension      Neuro   (+) Anxiety      GI   (+) GERD (gastroesophageal reflux disease)      ID   (+) AIDS (Multi)   (+) Anal condyloma       Clinical information reviewed:   Tobacco  Allergies  Meds   Med Hx  Surg Hx   Fam Hx          NPO Detail:  No data recorded     Physical Exam    Airway  Mallampati: I  TM distance: >3 FB  Neck ROM: full  Mouth opening: 3 or more finger widths     Cardiovascular   Rhythm: regular  Rate: normal     Dental - normal exam     Pulmonary - normal exam   Abdominal - normal exam     Other findings: C/o upper airway congestion 2 to recent respiratory infection.        Anesthesia Plan    History of general anesthesia?: yes  History of complications of general anesthesia?: no    ASA 2     MAC     The patient is not a current smoker.    intravenous induction   Anesthetic plan and risks discussed with patient.    Plan discussed with CRNA.

## 2025-07-16 NOTE — DISCHARGE INSTRUCTIONS
Patient Instructions after a Colonoscopy      The anesthetics, sedatives or narcotics which were given to you today will be acting in your body for the next 24 hours, so you might feel a little sleepy or groggy.  This feeling should slowly wear off. Carefully read and follow the instructions.     You received sedation today:  - Do not drive or operate any machinery or power tools of any kind.   - No alcoholic beverages today, not even beer or wine.  - Do not make any important decisions or sign any legal documents.  - No over the counter medications that contain alcohol or that may cause drowsiness.  - Do not make any important decisions or sign any legal documents.  - Make sure you have someone with you for first 24 hours.    While it is common to experience mild to moderate abdominal distention, gas, or belching after your procedure, if any of these symptoms occur following discharge from the GI Lab or within one week of having your procedure, call the [facility] to be advised whether a visit to your nearest Urgent Care or Emergency Department is indicated.  Take this paper with you if you go.     - If you develop an allergic reaction to the medications that were given during your procedure such as difficulty breathing, rash, hives, severe nausea, vomiting or lightheadedness.  - If you experience chest pain, shortness of breath, severe abdominal pain, fevers and chills.  -If you develop signs and symptoms of bleeding such as blood in your spit, if your stools turn black, tarry, or bloody  - If you have not urinated within 8 hours following your procedure.  - If your IV site becomes painful, red, inflamed, or looks infected.    If you received a biopsy/polypectomy/sphincterotomy the following instructions apply below:    __ Do not use Aspirin containing products, non-steroidal medications or anti-coagulants for one week following your procedure. (Examples of these types of medications are: Advil,  Arthrotec, Aleve, Coumadin, Ecotrin, Heparin, Ibuprofen, Indocin, Motrin, Naprosyn, Nuprin, Plavix, Vioxx, and Voltarin, or their generic forms.  This list is not all-inclusive.  Check with your physician or pharmacist before resuming medications.)   __ Eat a soft diet today.  Avoid foods that are poorly digested for the next 24 hours.  These foods would include: nuts, beans, lettuce, red meats, and fried foods. Start with liquids and advance your diet as tolerated, gradually work up to eating solids.   __ Do not have a Barium Study or Enema for one week.    Your physician recommends the additional following instructions:    -You have a contact number available for emergencies. The signs and symptoms of potential delayed complications were discussed with you. You may return to normal activities tomorrow.  -Resume your previous diet.  -Continue your present medications.   -We are waiting for your pathology results.  -Your physician has recommended a repeat colonoscopy (date to be determined after pending pathology results are reviewed) for surveillance based on pathology results.  -The findings and recommendations have been discussed with you.  -The findings and recommendations were discussed with your family.  - Please see Medication Reconciliation Form for new medication/medications prescribed.       If you experience any problems or have any questions following discharge from the GI Lab, please call:        Nurse Signature                                                                        Date___________________                                                                            Patient/Responsible Party Signature                                        Date___________________

## 2025-07-17 NOTE — ANESTHESIA PROCEDURE NOTES
Airway  Date/Time: 7/16/2025 12:28 PM  Reason: elective    Airway not difficult    Staffing  Performed: CRNA   Authorized by: Alyssa Brown MD    Performed by: Alyssa Brown MD  Patient location during procedure: OR    Patient Condition  Indications for airway management: anesthesia  Sedation level: deep     Final Airway Details  Preoxygenated: no  Final airway type: mask (Nasal canula)   Ventilation between attempts: spontaneous

## 2025-07-17 NOTE — ADDENDUM NOTE
Addendum  created 07/17/25 0821 by Alyssa Brown MD    Child order released for a procedure order, Clinical Note Signed, Intraprocedure Blocks edited, LDA created via procedure documentation, SmartForm saved

## 2025-07-29 DIAGNOSIS — I10 HYPERTENSION, UNSPECIFIED TYPE: ICD-10-CM

## 2025-07-29 RX ORDER — HYDROCHLOROTHIAZIDE 12.5 MG/1
12.5 TABLET ORAL DAILY
Qty: 30 TABLET | Refills: 5 | Status: SHIPPED | OUTPATIENT
Start: 2025-07-29

## 2025-07-30 DIAGNOSIS — B20 AIDS (MULTI): ICD-10-CM

## 2025-07-30 DIAGNOSIS — M50.30 DISC DISEASE, DEGENERATIVE, CERVICAL: ICD-10-CM

## 2025-07-31 RX ORDER — ELVITEGRAVIR, COBICISTAT, EMTRICITABINE, AND TENOFOVIR ALAFENAMIDE 150; 150; 200; 10 MG/1; MG/1; MG/1; MG/1
1 TABLET ORAL DAILY
Qty: 30 TABLET | Refills: 5 | Status: SHIPPED | OUTPATIENT
Start: 2025-07-31

## 2025-07-31 RX ORDER — NAPROXEN 500 MG/1
500 TABLET ORAL 2 TIMES DAILY
Qty: 60 TABLET | Refills: 5 | Status: SHIPPED | OUTPATIENT
Start: 2025-07-31

## 2025-08-19 ENCOUNTER — APPOINTMENT (OUTPATIENT)
Dept: SURGERY | Facility: CLINIC | Age: 60
End: 2025-08-19
Payer: COMMERCIAL

## 2025-08-19 ENCOUNTER — PREP FOR PROCEDURE (OUTPATIENT)
Dept: SURGERY | Facility: HOSPITAL | Age: 60
End: 2025-08-19

## 2025-08-19 VITALS
OXYGEN SATURATION: 96 % | SYSTOLIC BLOOD PRESSURE: 132 MMHG | BODY MASS INDEX: 35.62 KG/M2 | DIASTOLIC BLOOD PRESSURE: 69 MMHG | WEIGHT: 270 LBS | HEART RATE: 69 BPM

## 2025-08-19 DIAGNOSIS — K62.82 ANAL DYSPLASIA: Primary | ICD-10-CM

## 2025-08-19 PROCEDURE — 99214 OFFICE O/P EST MOD 30 MIN: CPT | Performed by: SURGERY

## 2025-08-19 PROCEDURE — 3078F DIAST BP <80 MM HG: CPT | Performed by: SURGERY

## 2025-08-19 PROCEDURE — 3075F SYST BP GE 130 - 139MM HG: CPT | Performed by: SURGERY

## 2025-08-21 ENCOUNTER — TREATMENT (OUTPATIENT)
Dept: PHYSICAL THERAPY | Facility: CLINIC | Age: 60
End: 2025-08-21

## 2025-08-21 DIAGNOSIS — R10.9 FLANK PAIN: Primary | ICD-10-CM

## 2025-08-21 PROCEDURE — 4200000004 HC PT PHASE II 15 MIN CHG: Mod: GP | Performed by: PHYSICAL THERAPIST

## 2025-08-21 ASSESSMENT — PAIN SCALES - GENERAL: PAINLEVEL_OUTOF10: 7

## 2025-08-21 ASSESSMENT — PAIN - FUNCTIONAL ASSESSMENT: PAIN_FUNCTIONAL_ASSESSMENT: 0-10

## 2025-08-29 DIAGNOSIS — M54.50 CHRONIC LOW BACK PAIN, UNSPECIFIED BACK PAIN LATERALITY, UNSPECIFIED WHETHER SCIATICA PRESENT: ICD-10-CM

## 2025-08-29 DIAGNOSIS — G89.29 CHRONIC LOW BACK PAIN, UNSPECIFIED BACK PAIN LATERALITY, UNSPECIFIED WHETHER SCIATICA PRESENT: ICD-10-CM

## 2025-09-01 DIAGNOSIS — I10 HYPERTENSION, UNSPECIFIED TYPE: ICD-10-CM

## 2025-09-01 DIAGNOSIS — K21.9 GASTROESOPHAGEAL REFLUX DISEASE, UNSPECIFIED WHETHER ESOPHAGITIS PRESENT: ICD-10-CM

## 2025-09-02 RX ORDER — LISINOPRIL 10 MG/1
10 TABLET ORAL
Qty: 30 TABLET | Refills: 5 | Status: SHIPPED | OUTPATIENT
Start: 2025-09-02

## 2025-09-02 RX ORDER — LANSOPRAZOLE 30 MG/1
30 CAPSULE, DELAYED RELEASE ORAL
Qty: 60 CAPSULE | Refills: 5 | Status: SHIPPED | OUTPATIENT
Start: 2025-09-02